# Patient Record
Sex: MALE | Race: WHITE | Employment: FULL TIME | URBAN - METROPOLITAN AREA
[De-identification: names, ages, dates, MRNs, and addresses within clinical notes are randomized per-mention and may not be internally consistent; named-entity substitution may affect disease eponyms.]

---

## 2017-01-11 ENCOUNTER — APPOINTMENT (OUTPATIENT)
Dept: LAB | Age: 57
End: 2017-01-11
Attending: INTERNAL MEDICINE

## 2017-01-11 DIAGNOSIS — K21.9 GASTROESOPHAGEAL REFLUX DISEASE, ESOPHAGITIS PRESENCE NOT SPECIFIED: ICD-10-CM

## 2017-01-11 DIAGNOSIS — K63.5 POLYP OF COLON, UNSPECIFIED PART OF COLON, UNSPECIFIED TYPE: ICD-10-CM

## 2017-01-11 PROCEDURE — 88305 TISSUE EXAM BY PATHOLOGIST: CPT

## 2017-01-23 NOTE — PROGRESS NOTES
Quick Note:    1/23/2017  [address]   [address] [address]    Dear Memory Marco A,      Here are the biopsy/pathology findings from your recent EGD (upper endoscopy) and colonoscopy:     The biopsy/pathology findings from your upper endosco

## 2017-02-06 PROBLEM — M67.449 DIGITAL MUCINOUS CYST: Status: ACTIVE | Noted: 2017-02-06

## 2017-03-08 PROBLEM — Z79.899 ENCOUNTER FOR LONG-TERM (CURRENT) USE OF MEDICATIONS: Status: ACTIVE | Noted: 2017-03-08

## 2017-03-08 PROBLEM — Z12.5 SPECIAL SCREENING FOR MALIGNANT NEOPLASM OF PROSTATE: Status: ACTIVE | Noted: 2017-03-08

## 2017-03-08 PROBLEM — Z00.00 LABORATORY EXAMINATION ORDERED AS PART OF A ROUTINE GENERAL MEDICAL EXAMINATION: Status: ACTIVE | Noted: 2017-03-08

## 2017-03-08 PROBLEM — Z11.59 NEED FOR HEPATITIS C SCREENING TEST: Status: ACTIVE | Noted: 2017-03-08

## 2017-04-02 PROBLEM — J32.0 CHRONIC MAXILLARY SINUSITIS: Status: ACTIVE | Noted: 2017-04-02

## 2017-08-04 PROCEDURE — 82043 UR ALBUMIN QUANTITATIVE: CPT | Performed by: INTERNAL MEDICINE

## 2017-08-04 PROCEDURE — 84153 ASSAY OF PSA TOTAL: CPT | Performed by: INTERNAL MEDICINE

## 2017-08-04 PROCEDURE — 82570 ASSAY OF URINE CREATININE: CPT | Performed by: INTERNAL MEDICINE

## 2017-08-07 PROBLEM — Z80.42 FAMILY HISTORY OF PROSTATE CANCER IN FATHER: Status: ACTIVE | Noted: 2017-08-07

## 2017-08-07 PROBLEM — Z00.00 LABORATORY EXAMINATION ORDERED AS PART OF A ROUTINE GENERAL MEDICAL EXAMINATION: Status: ACTIVE | Noted: 2017-08-07

## 2017-08-07 PROBLEM — Z79.899 ENCOUNTER FOR LONG-TERM (CURRENT) USE OF MEDICATIONS: Status: ACTIVE | Noted: 2017-08-07

## 2017-08-07 PROBLEM — Z12.5 SPECIAL SCREENING FOR MALIGNANT NEOPLASM OF PROSTATE: Status: ACTIVE | Noted: 2017-08-07

## 2017-08-07 PROBLEM — Z11.59 NEED FOR HEPATITIS C SCREENING TEST: Status: ACTIVE | Noted: 2017-08-07

## 2017-09-29 PROCEDURE — 87147 CULTURE TYPE IMMUNOLOGIC: CPT | Performed by: SURGERY

## 2017-09-29 PROCEDURE — 87070 CULTURE OTHR SPECIMN AEROBIC: CPT | Performed by: SURGERY

## 2017-09-29 PROCEDURE — 87205 SMEAR GRAM STAIN: CPT | Performed by: SURGERY

## 2017-09-29 PROCEDURE — 87075 CULTR BACTERIA EXCEPT BLOOD: CPT | Performed by: SURGERY

## 2017-09-29 PROCEDURE — 87184 SC STD DISK METHOD PER PLATE: CPT | Performed by: SURGERY

## 2018-06-01 PROBLEM — J32.0 CHRONIC MAXILLARY SINUSITIS: Status: RESOLVED | Noted: 2017-04-02 | Resolved: 2018-06-01

## 2018-06-01 PROBLEM — M67.449 DIGITAL MUCINOUS CYST: Status: RESOLVED | Noted: 2017-02-06 | Resolved: 2018-06-01

## 2018-06-08 PROCEDURE — 82570 ASSAY OF URINE CREATININE: CPT | Performed by: INTERNAL MEDICINE

## 2018-06-08 PROCEDURE — 82043 UR ALBUMIN QUANTITATIVE: CPT | Performed by: INTERNAL MEDICINE

## 2020-08-03 ENCOUNTER — APPOINTMENT (OUTPATIENT)
Dept: LAB | Age: 60
End: 2020-08-03
Attending: FAMILY MEDICINE
Payer: COMMERCIAL

## 2020-08-03 ENCOUNTER — TELEPHONE (OUTPATIENT)
Dept: ENDOCRINOLOGY CLINIC | Facility: CLINIC | Age: 60
End: 2020-08-03

## 2020-08-03 ENCOUNTER — OFFICE VISIT (OUTPATIENT)
Dept: INTERNAL MEDICINE CLINIC | Facility: CLINIC | Age: 60
End: 2020-08-03
Payer: COMMERCIAL

## 2020-08-03 VITALS
RESPIRATION RATE: 16 BRPM | DIASTOLIC BLOOD PRESSURE: 68 MMHG | BODY MASS INDEX: 41.3 KG/M2 | HEIGHT: 73.23 IN | TEMPERATURE: 97 F | HEART RATE: 72 BPM | SYSTOLIC BLOOD PRESSURE: 128 MMHG | WEIGHT: 315 LBS

## 2020-08-03 DIAGNOSIS — E11.29 TYPE 2 DIABETES MELLITUS WITH MICROALBUMINURIA, WITHOUT LONG-TERM CURRENT USE OF INSULIN (HCC): Primary | ICD-10-CM

## 2020-08-03 DIAGNOSIS — E11.29 TYPE 2 DIABETES MELLITUS WITH MICROALBUMINURIA, WITHOUT LONG-TERM CURRENT USE OF INSULIN (HCC): ICD-10-CM

## 2020-08-03 DIAGNOSIS — I10 ESSENTIAL HYPERTENSION: ICD-10-CM

## 2020-08-03 DIAGNOSIS — R80.9 TYPE 2 DIABETES MELLITUS WITH MICROALBUMINURIA, WITHOUT LONG-TERM CURRENT USE OF INSULIN (HCC): Primary | ICD-10-CM

## 2020-08-03 DIAGNOSIS — G47.33 OSA ON CPAP: ICD-10-CM

## 2020-08-03 DIAGNOSIS — Z00.00 WELLNESS EXAMINATION: ICD-10-CM

## 2020-08-03 DIAGNOSIS — Z99.89 OSA ON CPAP: ICD-10-CM

## 2020-08-03 DIAGNOSIS — R80.9 TYPE 2 DIABETES MELLITUS WITH MICROALBUMINURIA, WITHOUT LONG-TERM CURRENT USE OF INSULIN (HCC): ICD-10-CM

## 2020-08-03 LAB
ALBUMIN SERPL-MCNC: 3.8 G/DL (ref 3.4–5)
ALBUMIN/GLOB SERPL: 1.1 {RATIO} (ref 1–2)
ALP LIVER SERPL-CCNC: 66 U/L (ref 45–117)
ALT SERPL-CCNC: 25 U/L (ref 16–61)
ANION GAP SERPL CALC-SCNC: 4 MMOL/L (ref 0–18)
AST SERPL-CCNC: 13 U/L (ref 15–37)
BILIRUB SERPL-MCNC: 0.6 MG/DL (ref 0.1–2)
BUN BLD-MCNC: 18 MG/DL (ref 7–18)
BUN/CREAT SERPL: 22.2 (ref 10–20)
CALCIUM BLD-MCNC: 8.9 MG/DL (ref 8.5–10.1)
CHLORIDE SERPL-SCNC: 104 MMOL/L (ref 98–112)
CHOLEST SMN-MCNC: 168 MG/DL (ref ?–200)
CO2 SERPL-SCNC: 30 MMOL/L (ref 21–32)
CREAT BLD-MCNC: 0.81 MG/DL (ref 0.7–1.3)
EST. AVERAGE GLUCOSE BLD GHB EST-MCNC: 163 MG/DL (ref 68–126)
GLOBULIN PLAS-MCNC: 3.6 G/DL (ref 2.8–4.4)
GLUCOSE BLD-MCNC: 205 MG/DL (ref 70–99)
HBA1C MFR BLD HPLC: 7.3 % (ref ?–5.7)
HDLC SERPL-MCNC: 39 MG/DL (ref 40–59)
LDLC SERPL CALC-MCNC: 96 MG/DL (ref ?–100)
M PROTEIN MFR SERPL ELPH: 7.4 G/DL (ref 6.4–8.2)
NONHDLC SERPL-MCNC: 129 MG/DL (ref ?–130)
OSMOLALITY SERPL CALC.SUM OF ELEC: 294 MOSM/KG (ref 275–295)
PATIENT FASTING Y/N/NP: YES
PATIENT FASTING Y/N/NP: YES
POTASSIUM SERPL-SCNC: 3.9 MMOL/L (ref 3.5–5.1)
SODIUM SERPL-SCNC: 138 MMOL/L (ref 136–145)
TRIGL SERPL-MCNC: 165 MG/DL (ref 30–149)
VLDLC SERPL CALC-MCNC: 33 MG/DL (ref 0–30)

## 2020-08-03 PROCEDURE — 80053 COMPREHEN METABOLIC PANEL: CPT

## 2020-08-03 PROCEDURE — 3074F SYST BP LT 130 MM HG: CPT | Performed by: FAMILY MEDICINE

## 2020-08-03 PROCEDURE — 80061 LIPID PANEL: CPT

## 2020-08-03 PROCEDURE — 99386 PREV VISIT NEW AGE 40-64: CPT | Performed by: FAMILY MEDICINE

## 2020-08-03 PROCEDURE — 3078F DIAST BP <80 MM HG: CPT | Performed by: FAMILY MEDICINE

## 2020-08-03 PROCEDURE — 83036 HEMOGLOBIN GLYCOSYLATED A1C: CPT

## 2020-08-03 PROCEDURE — 3008F BODY MASS INDEX DOCD: CPT | Performed by: FAMILY MEDICINE

## 2020-08-03 RX ORDER — PIOGLITAZONEHYDROCHLORIDE 30 MG/1
30 TABLET ORAL DAILY
Qty: 90 TABLET | Refills: 1 | Status: SHIPPED | OUTPATIENT
Start: 2020-08-03 | End: 2021-12-30

## 2020-08-03 RX ORDER — FOSINOPRIL SODIUM 20 MG/1
20 TABLET ORAL DAILY
Qty: 90 TABLET | Refills: 1 | Status: SHIPPED | OUTPATIENT
Start: 2020-08-03 | End: 2021-12-30

## 2020-08-03 RX ORDER — GLIMEPIRIDE 4 MG/1
4 TABLET ORAL DAILY
Qty: 90 TABLET | Refills: 1 | Status: SHIPPED | OUTPATIENT
Start: 2020-08-03 | End: 2021-07-29

## 2020-08-03 NOTE — PATIENT INSTRUCTIONS
Please obtain labs at earliest convenience. Continue all medications as directed. Call to schedule with diabetic educator. Follow-up in 4 weeks or sooner depending on results of labs.

## 2020-08-03 NOTE — PROGRESS NOTES
Tracy Meza  9/9/1960    Patient presents with:  Establish Care: SN Rm 6  Wellness Visit      HPI:   Tracy Meza is a 61year old male who presents with to establish care with a new primary care provider.  He has been treated for HTN, DM type II, and Fluticasone Propionate 50 MCG/ACT Nasal Suspension Two sprays in each nostril once daily 1 Bottle 6   • cetirizine (ZYRTEC) 10 MG Oral Tab Take 10 mg by mouth nightly as needed.   2   • Blood Glucose Monitoring Suppl (Nancy Mac IQ SYSTEM) W/DEVICE Does pain  NEURO: denies headaches    EXAM:   /68 (BP Location: Left arm, Patient Position: Sitting, Cuff Size: adult)   Pulse 72   Temp 97.2 °F (36.2 °C) (Temporal)   Resp 16   Ht 73.23\"   Wt (!) 328 lb (148.8 kg)   BMI 43.01 kg/m²   GENERAL: Well devel HCl 30 MG Oral Tab; Take 1 tablet (30 mg total) by mouth daily. Dispense: 90 tablet; Refill: 1  - SITagliptin-metFORMIN HCl (JANUMET)  MG Oral Tab; Take 1 tablet by mouth 2 (two) times daily with meals. Dispense: 180 tablet;  Refill: 1  - OP REFERR

## 2020-08-04 DIAGNOSIS — E78.5 HYPERLIPIDEMIA, UNSPECIFIED HYPERLIPIDEMIA TYPE: Primary | ICD-10-CM

## 2020-08-07 ENCOUNTER — TELEPHONE (OUTPATIENT)
Dept: INTERNAL MEDICINE CLINIC | Facility: CLINIC | Age: 60
End: 2020-08-07

## 2020-09-03 ENCOUNTER — TELEPHONE (OUTPATIENT)
Dept: FAMILY MEDICINE CLINIC | Facility: CLINIC | Age: 60
End: 2020-09-03

## 2020-09-03 ENCOUNTER — OFFICE VISIT (OUTPATIENT)
Dept: INTERNAL MEDICINE CLINIC | Facility: CLINIC | Age: 60
End: 2020-09-03
Payer: COMMERCIAL

## 2020-09-03 VITALS
HEART RATE: 75 BPM | DIASTOLIC BLOOD PRESSURE: 80 MMHG | SYSTOLIC BLOOD PRESSURE: 136 MMHG | TEMPERATURE: 97 F | OXYGEN SATURATION: 97 % | HEIGHT: 74 IN | WEIGHT: 315 LBS | BODY MASS INDEX: 40.43 KG/M2 | RESPIRATION RATE: 16 BRPM

## 2020-09-03 DIAGNOSIS — J30.81 ALLERGIC RHINITIS DUE TO DOG HAIR: ICD-10-CM

## 2020-09-03 DIAGNOSIS — E11.29 TYPE 2 DIABETES MELLITUS WITH MICROALBUMINURIA, WITHOUT LONG-TERM CURRENT USE OF INSULIN (HCC): Primary | ICD-10-CM

## 2020-09-03 DIAGNOSIS — R80.9 TYPE 2 DIABETES MELLITUS WITH MICROALBUMINURIA, WITHOUT LONG-TERM CURRENT USE OF INSULIN (HCC): Primary | ICD-10-CM

## 2020-09-03 DIAGNOSIS — I10 ESSENTIAL HYPERTENSION: ICD-10-CM

## 2020-09-03 DIAGNOSIS — E66.01 CLASS 3 SEVERE OBESITY DUE TO EXCESS CALORIES WITH BODY MASS INDEX (BMI) OF 40.0 TO 44.9 IN ADULT, UNSPECIFIED WHETHER SERIOUS COMORBIDITY PRESENT (HCC): ICD-10-CM

## 2020-09-03 PROCEDURE — 3075F SYST BP GE 130 - 139MM HG: CPT | Performed by: FAMILY MEDICINE

## 2020-09-03 PROCEDURE — 3079F DIAST BP 80-89 MM HG: CPT | Performed by: FAMILY MEDICINE

## 2020-09-03 PROCEDURE — 99214 OFFICE O/P EST MOD 30 MIN: CPT | Performed by: FAMILY MEDICINE

## 2020-09-03 PROCEDURE — 3008F BODY MASS INDEX DOCD: CPT | Performed by: FAMILY MEDICINE

## 2020-09-03 NOTE — PROGRESS NOTES
Coco Garcia  9/9/1960    Patient presents with: Follow - Up: jonh      Kyle Barnard is a 61year old male who presents for follow-up on his DM. He is doing well with his medications.  He was not able to get established with the DM educato (4 mg total) by mouth daily. 90 tablet 1   • Pioglitazone HCl 30 MG Oral Tab Take 1 tablet (30 mg total) by mouth daily. 90 tablet 1   • SITagliptin-metFORMIN HCl (JANUMET)  MG Oral Tab Take 1 tablet by mouth 2 (two) times daily with meals.  180 tabl Glasses of wine per week      Frequency: 2-3 times a week      Drinks per session: 1 or 2      Binge frequency: Never      Comment: CAGE 8/3/20    Drug use: No        OBJECTIVE:   /80   Pulse 75   Temp 97.2 °F (36.2 °C) (Temporal)   Resp 16   Ht 74\" Essential hypertension    3. Class 3 severe obesity due to excess calories with body mass index (BMI) of 40.0 to 44.9 in adult, unspecified whether serious comorbidity present ([address]    4.  Allergic rhinitis due to dog hair      All questions were answered a

## 2021-03-01 ENCOUNTER — TELEMEDICINE (OUTPATIENT)
Dept: TELEHEALTH | Age: 61
End: 2021-03-01

## 2021-03-01 DIAGNOSIS — J32.0 CHRONIC MAXILLARY SINUSITIS: Primary | ICD-10-CM

## 2021-03-01 PROCEDURE — 99203 OFFICE O/P NEW LOW 30 MIN: CPT | Performed by: NURSE PRACTITIONER

## 2021-03-01 RX ORDER — CLARITHROMYCIN 500 MG/1
500 TABLET, COATED ORAL 2 TIMES DAILY
Qty: 20 TABLET | Refills: 0 | Status: SHIPPED | OUTPATIENT
Start: 2021-03-01 | End: 2021-09-30

## 2021-03-01 NOTE — PROGRESS NOTES
This is a telemedicine visit with live, interactive video and audio. Patient understands and accepts financial responsibility for any deductible, co-insurance and/or co-pays associated with this service.     SUBJECTIVE  \"I'm sure I have a sinus infecti Inhale 1 puff into the lungs every 6 (six) hours as needed for Wheezing.  1 Inhaler 0   • Glucose Blood In Vitro Strip SMBG  each 6   • Fluticasone Propionate 50 MCG/ACT Nasal Suspension Two sprays in each nostril once daily 1 Bottle 6   • cetirizine nasal spray over the counter to loosen nasal secretions  · Finish all antibiotics as ordered      LAURA Martines    (10) Minutes of time was accrued for this Video Visit.

## 2021-03-01 NOTE — PATIENT INSTRUCTIONS
Sinusitis (Antibiotic Treatment)    The sinuses are air-filled spaces within the bones of the face. They connect to the inside of the nose. Sinusitis is an inflammation of the tissue that lines the sinuses. Sinusitis can occur during a cold.  It can also pressure should not use decongestants. They can raise blood pressure.) Talk with your provider or pharmacist if you have any questions about the medicine. .  · OTC antihistamines may help if allergies contributed to your sinusitis.  Talk with your provider o information is not intended as a substitute for professional medical care. Always follow your healthcare professional's instructions.

## 2021-03-20 DIAGNOSIS — Z23 NEED FOR VACCINATION: ICD-10-CM

## 2021-09-30 ENCOUNTER — HOSPITAL ENCOUNTER (OUTPATIENT)
Age: 61
Discharge: HOME | End: 2021-09-30
Payer: COMMERCIAL

## 2021-09-30 VITALS
TEMPERATURE: 98 F | HEART RATE: 94 BPM | HEIGHT: 74 IN | WEIGHT: 305 LBS | OXYGEN SATURATION: 99 % | RESPIRATION RATE: 16 BRPM | SYSTOLIC BLOOD PRESSURE: 142 MMHG | BODY MASS INDEX: 39.14 KG/M2 | DIASTOLIC BLOOD PRESSURE: 86 MMHG

## 2021-09-30 DIAGNOSIS — Z11.52 ENCOUNTER FOR SCREENING FOR COVID-19: ICD-10-CM

## 2021-09-30 DIAGNOSIS — J01.90 ACUTE NON-RECURRENT SINUSITIS, UNSPECIFIED LOCATION: Primary | ICD-10-CM

## 2021-09-30 PROCEDURE — 99213 OFFICE O/P EST LOW 20 MIN: CPT | Performed by: PHYSICIAN ASSISTANT

## 2021-09-30 PROCEDURE — U0002 COVID-19 LAB TEST NON-CDC: HCPCS | Performed by: PHYSICIAN ASSISTANT

## 2021-09-30 RX ORDER — DOXYCYCLINE HYCLATE 100 MG/1
100 CAPSULE ORAL 2 TIMES DAILY
Qty: 14 CAPSULE | Refills: 0 | Status: SHIPPED | OUTPATIENT
Start: 2021-09-30 | End: 2021-10-07

## 2021-09-30 NOTE — ED INITIAL ASSESSMENT (HPI)
Patient here for evaluation of sinus pain and pressure with throbbing headaches that started 4-5 days ago.  States the right side of his face is more painful than left and that is the side he had sinus surgery in the past. This morning he had a couple of bl

## 2021-09-30 NOTE — ED PROVIDER NOTES
Patient Seen in: Immediate [address] [place] [address]      History   Patient presents with:  Sinus Problem    Stated Complaint: sinus problem    Subjective:   HPI    15-year-old male presents to the [address] [place] [address] for evaluation of sinusitis symptoms for 4 days.   Has fac nursing note reviewed. Constitutional:       Appearance: He is well-developed. HENT:      Head: Atraumatic. Right Ear: External ear normal.      Left Ear: External ear normal.      Nose: Congestion present. No rhinorrhea.       Right Sinus: [place] [id number]  [phone]                Medications Prescribed:  Current Discharge Medication List    START taking these medications    doxycycline 100 MG Oral Cap  Take 1 capsule (100 mg total) by mouth 2 (two) times daily for 7 days.   Qty: 14 capsule Refills:

## 2021-10-05 ENCOUNTER — HOSPITAL ENCOUNTER (OUTPATIENT)
Age: 61
Discharge: HOME | End: 2021-10-05
Payer: COMMERCIAL

## 2021-10-05 VITALS
WEIGHT: 306 LBS | RESPIRATION RATE: 18 BRPM | HEART RATE: 96 BPM | HEIGHT: 74 IN | SYSTOLIC BLOOD PRESSURE: 143 MMHG | BODY MASS INDEX: 39.27 KG/M2 | OXYGEN SATURATION: 95 % | DIASTOLIC BLOOD PRESSURE: 79 MMHG | TEMPERATURE: 101 F

## 2021-10-05 DIAGNOSIS — J01.91 ACUTE RECURRENT SINUSITIS, UNSPECIFIED LOCATION: Primary | ICD-10-CM

## 2021-10-05 PROCEDURE — 99214 OFFICE O/P EST MOD 30 MIN: CPT | Performed by: PHYSICIAN ASSISTANT

## 2021-10-05 RX ORDER — LEVOFLOXACIN 750 MG/1
750 TABLET ORAL DAILY
Qty: 10 TABLET | Refills: 0 | Status: SHIPPED | OUTPATIENT
Start: 2021-10-05 | End: 2021-10-15

## 2021-10-05 RX ORDER — ACETAMINOPHEN 325 MG/1
650 TABLET ORAL ONCE
Status: COMPLETED | OUTPATIENT
Start: 2021-10-05 | End: 2021-10-05

## 2021-10-05 NOTE — ED PROVIDER NOTES
Patient Seen in: Immediate [address] [place] [address]      History   Patient presents with:  Sinus Problem    Stated Complaint: sinus sensitivity chills x 10 days    Subjective:   HPI    59-year-old male presents the immediate care for what he considers a sin Musculoskeletal: Negative for neck stiffness. Allergic/Immunologic: Negative for food allergies. Neurological: Negative for headaches. Psychiatric/Behavioral: Negative for suicidal ideas. All other systems reviewed and are negative.       Positive weakness.       Coordination: Coordination normal.      Gait: Gait normal.   Psychiatric:         Mood and Affect: Mood normal.               ED Course   Labs Reviewed - No data to display                MDM      44-year-old male presents immediate care for

## 2021-10-05 NOTE — ED INITIAL ASSESSMENT (HPI)
Patient here for continued sinus pain and pressure. States the doxycycline isn't helping. He has had continued to have chills. Symptoms have been present x about 10 days but have continued to get worse.  States in the past, levaquin has been the only antibi

## 2021-11-02 ENCOUNTER — TELEPHONE (OUTPATIENT)
Dept: INTERNAL MEDICINE CLINIC | Facility: CLINIC | Age: 61
End: 2021-11-02

## 2021-11-02 NOTE — TELEPHONE ENCOUNTER
Received eye exam that shows mild non-proliferative diabetic retinopathy. Patient is overdue for DM follow-up. Please call to schedule.

## 2021-11-05 ENCOUNTER — PATIENT MESSAGE (OUTPATIENT)
Dept: INTERNAL MEDICINE CLINIC | Facility: CLINIC | Age: 61
End: 2021-11-05

## 2021-11-16 ENCOUNTER — TELEPHONE (OUTPATIENT)
Dept: INTERNAL MEDICINE CLINIC | Facility: CLINIC | Age: 61
End: 2021-11-16

## 2021-11-16 NOTE — TELEPHONE ENCOUNTER
Patient scheduled for cpe, DM fu.     Future Appointments   Date Time Provider Dannie Clarke   11/29/2021  8:40 AM Ayanna Rodrigues MD [facility]

## 2021-11-16 NOTE — TELEPHONE ENCOUNTER
Orders to Leti Sheht aware must fast no call back required  Future Appointments   Date Time Provider Dannie Clarke   11/29/2021  8:40 AM Sakina Foster MD [facility]

## 2021-12-30 ENCOUNTER — HOSPITAL ENCOUNTER (OUTPATIENT)
Dept: ULTRASOUND IMAGING | Age: 61
Discharge: HOME | End: 2021-12-30
Attending: FAMILY MEDICINE
Payer: COMMERCIAL

## 2021-12-30 ENCOUNTER — OFFICE VISIT (OUTPATIENT)
Dept: INTERNAL MEDICINE CLINIC | Facility: CLINIC | Age: 61
End: 2021-12-30
Payer: COMMERCIAL

## 2021-12-30 VITALS
BODY MASS INDEX: 38.5 KG/M2 | DIASTOLIC BLOOD PRESSURE: 82 MMHG | WEIGHT: 300 LBS | TEMPERATURE: 97 F | HEART RATE: 60 BPM | RESPIRATION RATE: 16 BRPM | HEIGHT: 74 IN | SYSTOLIC BLOOD PRESSURE: 142 MMHG

## 2021-12-30 DIAGNOSIS — N50.89 SCROTAL SWELLING: ICD-10-CM

## 2021-12-30 DIAGNOSIS — E78.5 HYPERLIPIDEMIA, UNSPECIFIED HYPERLIPIDEMIA TYPE: ICD-10-CM

## 2021-12-30 DIAGNOSIS — R80.9 TYPE 2 DIABETES MELLITUS WITH MICROALBUMINURIA, WITHOUT LONG-TERM CURRENT USE OF INSULIN (HCC): ICD-10-CM

## 2021-12-30 DIAGNOSIS — N50.811 RIGHT TESTICULAR PAIN: ICD-10-CM

## 2021-12-30 DIAGNOSIS — E11.29 TYPE 2 DIABETES MELLITUS WITH MICROALBUMINURIA, WITHOUT LONG-TERM CURRENT USE OF INSULIN (HCC): ICD-10-CM

## 2021-12-30 DIAGNOSIS — N50.811 RIGHT TESTICULAR PAIN: Primary | ICD-10-CM

## 2021-12-30 DIAGNOSIS — I10 ESSENTIAL HYPERTENSION: ICD-10-CM

## 2021-12-30 PROCEDURE — 3077F SYST BP >= 140 MM HG: CPT | Performed by: FAMILY MEDICINE

## 2021-12-30 PROCEDURE — 3008F BODY MASS INDEX DOCD: CPT | Performed by: FAMILY MEDICINE

## 2021-12-30 PROCEDURE — 93975 VASCULAR STUDY: CPT | Performed by: FAMILY MEDICINE

## 2021-12-30 PROCEDURE — 87186 SC STD MICRODIL/AGAR DIL: CPT | Performed by: FAMILY MEDICINE

## 2021-12-30 PROCEDURE — 81003 URINALYSIS AUTO W/O SCOPE: CPT | Performed by: FAMILY MEDICINE

## 2021-12-30 PROCEDURE — 87088 URINE BACTERIA CULTURE: CPT | Performed by: FAMILY MEDICINE

## 2021-12-30 PROCEDURE — 99214 OFFICE O/P EST MOD 30 MIN: CPT | Performed by: FAMILY MEDICINE

## 2021-12-30 PROCEDURE — 87086 URINE CULTURE/COLONY COUNT: CPT | Performed by: FAMILY MEDICINE

## 2021-12-30 PROCEDURE — 76870 US EXAM SCROTUM: CPT | Performed by: FAMILY MEDICINE

## 2021-12-30 PROCEDURE — 3079F DIAST BP 80-89 MM HG: CPT | Performed by: FAMILY MEDICINE

## 2021-12-30 RX ORDER — SITAGLIPTIN AND METFORMIN HYDROCHLORIDE 50; 1000 MG/1; MG/1
1 TABLET, FILM COATED ORAL 2 TIMES DAILY WITH MEALS
Qty: 180 TABLET | Refills: 1 | Status: SHIPPED | OUTPATIENT
Start: 2021-12-30

## 2021-12-30 RX ORDER — PIOGLITAZONEHYDROCHLORIDE 30 MG/1
30 TABLET ORAL DAILY
Qty: 90 TABLET | Refills: 1 | Status: SHIPPED | OUTPATIENT
Start: 2021-12-30

## 2021-12-30 RX ORDER — FOSINOPRIL SODIUM 20 MG/1
20 TABLET ORAL DAILY
Qty: 90 TABLET | Refills: 1 | Status: SHIPPED | OUTPATIENT
Start: 2021-12-30

## 2021-12-30 NOTE — PROGRESS NOTES
Richard Presley  9/9/1960    Patient presents with:  Pain: RM 9 JY, pain started a week ago, right side on groin and testicle  is painful and swollen, uncomfortable when walking       HPI:   Richard Presley is a 64year old male who presents for evaluation o hyperlipidemia     Type 2 diabetes mellitus with microalbuminuria, without long-term current use of insulin [facility])     Past Surgical History:   Procedure Laterality Date   • COLONOSCOPY  2008   • COLONOSCOPY  01/11/2017    adenomatous polyp, diverticuli and will update me next week with symptoms. 1. Right testicular pain, Scrotal swelling  - US SCROTUM W/ DOPPLER (CPT=93975/33566);  Future  - URINE CULTURE, ROUTINE; Future  - URINALYSIS, AUTO, W/O SCOPE  - URINE CULTURE, ROUTINE      All questions were ans

## 2021-12-31 DIAGNOSIS — N45.1 ACUTE EPIDIDYMITIS: Primary | ICD-10-CM

## 2021-12-31 RX ORDER — LEVOFLOXACIN 500 MG/1
500 TABLET, FILM COATED ORAL DAILY
Qty: 10 TABLET | Refills: 0 | Status: SHIPPED | OUTPATIENT
Start: 2021-12-31 | End: 2022-01-10

## 2022-07-25 ENCOUNTER — TELEPHONE (OUTPATIENT)
Dept: INTERNAL MEDICINE CLINIC | Facility: CLINIC | Age: 62
End: 2022-07-25

## 2022-10-11 ENCOUNTER — TELEPHONE (OUTPATIENT)
Dept: INTERNAL MEDICINE CLINIC | Facility: CLINIC | Age: 62
End: 2022-10-11

## 2022-10-12 NOTE — TELEPHONE ENCOUNTER
Pt due for OV/CPE with [address] Labs ordered in 11/21. Schedule soon, complete fasting labs prior to OV. If not able to reach pt after multiple attempts then send letter.

## 2022-10-13 ENCOUNTER — LAB ENCOUNTER (OUTPATIENT)
Dept: LAB | Age: 62
End: 2022-10-13
Attending: FAMILY MEDICINE
Payer: COMMERCIAL

## 2022-10-13 DIAGNOSIS — E11.29 TYPE 2 DIABETES MELLITUS WITH MICROALBUMINURIA, WITHOUT LONG-TERM CURRENT USE OF INSULIN (HCC): ICD-10-CM

## 2022-10-13 DIAGNOSIS — Z12.5 SCREENING FOR MALIGNANT NEOPLASM OF PROSTATE: ICD-10-CM

## 2022-10-13 DIAGNOSIS — Z13.228 SCREENING FOR METABOLIC DISORDER: ICD-10-CM

## 2022-10-13 DIAGNOSIS — Z13.220 SCREENING FOR LIPID DISORDERS: ICD-10-CM

## 2022-10-13 DIAGNOSIS — Z00.00 ROUTINE GENERAL MEDICAL EXAMINATION AT A HEALTH CARE FACILITY: ICD-10-CM

## 2022-10-13 DIAGNOSIS — R80.9 TYPE 2 DIABETES MELLITUS WITH MICROALBUMINURIA, WITHOUT LONG-TERM CURRENT USE OF INSULIN (HCC): ICD-10-CM

## 2022-10-13 DIAGNOSIS — Z13.0 SCREENING FOR DISORDER OF BLOOD AND BLOOD-FORMING ORGANS: ICD-10-CM

## 2022-10-13 LAB
ALBUMIN SERPL-MCNC: 3.6 G/DL (ref 3.4–5)
ALBUMIN/GLOB SERPL: 1 {RATIO} (ref 1–2)
ALP LIVER SERPL-CCNC: 100 U/L
ALT SERPL-CCNC: 26 U/L
ANION GAP SERPL CALC-SCNC: 6 MMOL/L (ref 0–18)
AST SERPL-CCNC: 10 U/L (ref 15–37)
BASOPHILS # BLD AUTO: 0.08 X10(3) UL (ref 0–0.2)
BASOPHILS NFR BLD AUTO: 1.1 %
BILIRUB SERPL-MCNC: 0.8 MG/DL (ref 0.1–2)
BUN BLD-MCNC: 14 MG/DL (ref 7–18)
BUN/CREAT SERPL: 15.9 (ref 10–20)
CALCIUM BLD-MCNC: 9 MG/DL (ref 8.5–10.1)
CHLORIDE SERPL-SCNC: 101 MMOL/L (ref 98–112)
CHOLEST SERPL-MCNC: 163 MG/DL (ref ?–200)
CO2 SERPL-SCNC: 29 MMOL/L (ref 21–32)
COMPLEXED PSA SERPL-MCNC: 0.29 NG/ML (ref ?–4)
CREAT BLD-MCNC: 0.88 MG/DL
CREAT UR-SCNC: 64.9 MG/DL
DEPRECATED RDW RBC AUTO: 40.6 FL (ref 35.1–46.3)
EOSINOPHIL # BLD AUTO: 0.34 X10(3) UL (ref 0–0.7)
EOSINOPHIL NFR BLD AUTO: 4.8 %
ERYTHROCYTE [DISTWIDTH] IN BLOOD BY AUTOMATED COUNT: 12.6 % (ref 11–15)
EST. AVERAGE GLUCOSE BLD GHB EST-MCNC: 361 MG/DL (ref 68–126)
FASTING PATIENT LIPID ANSWER: YES
FASTING STATUS PATIENT QL REPORTED: YES
GFR SERPLBLD BASED ON 1.73 SQ M-ARVRAT: 97 ML/MIN/1.73M2 (ref 60–?)
GLOBULIN PLAS-MCNC: 3.6 G/DL (ref 2.8–4.4)
GLUCOSE BLD-MCNC: 376 MG/DL (ref 70–99)
HBA1C MFR BLD: 14.2 % (ref ?–5.7)
HCT VFR BLD AUTO: 46.9 %
HDLC SERPL-MCNC: 37 MG/DL (ref 40–59)
HGB BLD-MCNC: 15.2 G/DL
IMM GRANULOCYTES # BLD AUTO: 0.06 X10(3) UL (ref 0–1)
IMM GRANULOCYTES NFR BLD: 0.8 %
LDLC SERPL CALC-MCNC: 87 MG/DL (ref ?–100)
LYMPHOCYTES # BLD AUTO: 1.64 X10(3) UL (ref 1–4)
LYMPHOCYTES NFR BLD AUTO: 23.2 %
MCH RBC QN AUTO: 28.5 PG (ref 26–34)
MCHC RBC AUTO-ENTMCNC: 32.4 G/DL (ref 31–37)
MCV RBC AUTO: 87.8 FL
MICROALBUMIN UR-MCNC: 4.53 MG/DL
MICROALBUMIN/CREAT 24H UR-RTO: 69.8 UG/MG (ref ?–30)
MONOCYTES # BLD AUTO: 0.58 X10(3) UL (ref 0.1–1)
MONOCYTES NFR BLD AUTO: 8.2 %
NEUTROPHILS # BLD AUTO: 4.37 X10 (3) UL (ref 1.5–7.7)
NEUTROPHILS # BLD AUTO: 4.37 X10(3) UL (ref 1.5–7.7)
NEUTROPHILS NFR BLD AUTO: 61.9 %
NONHDLC SERPL-MCNC: 126 MG/DL (ref ?–130)
OSMOLALITY SERPL CALC.SUM OF ELEC: 298 MOSM/KG (ref 275–295)
PLATELET # BLD AUTO: 257 10(3)UL (ref 150–450)
POTASSIUM SERPL-SCNC: 4.3 MMOL/L (ref 3.5–5.1)
PROT SERPL-MCNC: 7.2 G/DL (ref 6.4–8.2)
RBC # BLD AUTO: 5.34 X10(6)UL
SODIUM SERPL-SCNC: 136 MMOL/L (ref 136–145)
TRIGL SERPL-MCNC: 233 MG/DL (ref 30–149)
VLDLC SERPL CALC-MCNC: 38 MG/DL (ref 0–30)
WBC # BLD AUTO: 7.1 X10(3) UL (ref 4–11)

## 2022-10-13 PROCEDURE — 82043 UR ALBUMIN QUANTITATIVE: CPT | Performed by: FAMILY MEDICINE

## 2022-10-13 PROCEDURE — 83036 HEMOGLOBIN GLYCOSYLATED A1C: CPT | Performed by: FAMILY MEDICINE

## 2022-10-13 PROCEDURE — 3061F NEG MICROALBUMINURIA REV: CPT | Performed by: FAMILY MEDICINE

## 2022-10-13 PROCEDURE — 80053 COMPREHEN METABOLIC PANEL: CPT | Performed by: FAMILY MEDICINE

## 2022-10-13 PROCEDURE — 80061 LIPID PANEL: CPT | Performed by: FAMILY MEDICINE

## 2022-10-13 PROCEDURE — 84153 ASSAY OF PSA TOTAL: CPT | Performed by: FAMILY MEDICINE

## 2022-10-13 PROCEDURE — 3046F HEMOGLOBIN A1C LEVEL >9.0%: CPT | Performed by: FAMILY MEDICINE

## 2022-10-13 PROCEDURE — 82570 ASSAY OF URINE CREATININE: CPT | Performed by: FAMILY MEDICINE

## 2022-10-13 PROCEDURE — 3060F POS MICROALBUMINURIA REV: CPT | Performed by: FAMILY MEDICINE

## 2022-10-13 PROCEDURE — 85025 COMPLETE CBC W/AUTO DIFF WBC: CPT | Performed by: FAMILY MEDICINE

## 2022-10-21 ENCOUNTER — OFFICE VISIT (OUTPATIENT)
Dept: INTERNAL MEDICINE CLINIC | Facility: CLINIC | Age: 62
End: 2022-10-21
Payer: COMMERCIAL

## 2022-10-21 VITALS
OXYGEN SATURATION: 99 % | WEIGHT: 289.38 LBS | HEART RATE: 89 BPM | RESPIRATION RATE: 18 BRPM | DIASTOLIC BLOOD PRESSURE: 98 MMHG | HEIGHT: 74 IN | BODY MASS INDEX: 37.14 KG/M2 | SYSTOLIC BLOOD PRESSURE: 162 MMHG

## 2022-10-21 DIAGNOSIS — Z00.00 WELLNESS EXAMINATION: Primary | ICD-10-CM

## 2022-10-21 DIAGNOSIS — E11.65 TYPE 2 DIABETES MELLITUS WITH HYPERGLYCEMIA, WITHOUT LONG-TERM CURRENT USE OF INSULIN (HCC): ICD-10-CM

## 2022-10-21 DIAGNOSIS — E78.2 MIXED HYPERLIPIDEMIA: ICD-10-CM

## 2022-10-21 DIAGNOSIS — Z86.010 HISTORY OF COLON POLYPS: ICD-10-CM

## 2022-10-21 DIAGNOSIS — Z99.89 OSA ON CPAP: ICD-10-CM

## 2022-10-21 DIAGNOSIS — Z12.11 COLON CANCER SCREENING: ICD-10-CM

## 2022-10-21 DIAGNOSIS — I10 ESSENTIAL HYPERTENSION: ICD-10-CM

## 2022-10-21 DIAGNOSIS — G47.33 OSA ON CPAP: ICD-10-CM

## 2022-10-21 PROCEDURE — 99396 PREV VISIT EST AGE 40-64: CPT | Performed by: FAMILY MEDICINE

## 2022-10-21 PROCEDURE — 3080F DIAST BP >= 90 MM HG: CPT | Performed by: FAMILY MEDICINE

## 2022-10-21 PROCEDURE — 90471 IMMUNIZATION ADMIN: CPT | Performed by: FAMILY MEDICINE

## 2022-10-21 PROCEDURE — 3008F BODY MASS INDEX DOCD: CPT | Performed by: FAMILY MEDICINE

## 2022-10-21 PROCEDURE — 90677 PCV20 VACCINE IM: CPT | Performed by: FAMILY MEDICINE

## 2022-10-21 PROCEDURE — 3077F SYST BP >= 140 MM HG: CPT | Performed by: FAMILY MEDICINE

## 2022-10-21 RX ORDER — PIOGLITAZONEHYDROCHLORIDE 30 MG/1
30 TABLET ORAL DAILY
Qty: 90 TABLET | Refills: 1 | Status: SHIPPED | OUTPATIENT
Start: 2022-10-21

## 2022-10-21 RX ORDER — FOSINOPRIL SODIUM 20 MG/1
20 TABLET ORAL DAILY
Qty: 90 TABLET | Refills: 1 | Status: SHIPPED | OUTPATIENT
Start: 2022-10-21

## 2022-10-21 RX ORDER — SITAGLIPTIN AND METFORMIN HYDROCHLORIDE 1000; 50 MG/1; MG/1
1 TABLET, FILM COATED ORAL 2 TIMES DAILY WITH MEALS
Qty: 180 TABLET | Refills: 1 | Status: SHIPPED | OUTPATIENT
Start: 2022-10-21

## 2022-10-25 ENCOUNTER — NURSE ONLY (OUTPATIENT)
Dept: ENDOCRINOLOGY CLINIC | Facility: CLINIC | Age: 62
End: 2022-10-25
Payer: COMMERCIAL

## 2022-10-25 DIAGNOSIS — E11.29 TYPE 2 DIABETES MELLITUS WITH MICROALBUMINURIA, WITHOUT LONG-TERM CURRENT USE OF INSULIN (HCC): Primary | ICD-10-CM

## 2022-10-25 DIAGNOSIS — R80.9 TYPE 2 DIABETES MELLITUS WITH MICROALBUMINURIA, WITHOUT LONG-TERM CURRENT USE OF INSULIN (HCC): Primary | ICD-10-CM

## 2022-10-25 PROCEDURE — G0108 DIAB MANAGE TRN  PER INDIV: HCPCS | Performed by: DIETITIAN, REGISTERED

## 2022-10-25 RX ORDER — BLOOD-GLUCOSE SENSOR
1 EACH MISCELLANEOUS
Qty: 6 EACH | Refills: 3 | Status: CANCELLED | OUTPATIENT
Start: 2022-10-25

## 2022-10-26 ENCOUNTER — TELEPHONE (OUTPATIENT)
Dept: ENDOCRINOLOGY CLINIC | Facility: CLINIC | Age: 62
End: 2022-10-26

## 2022-10-26 RX ORDER — BLOOD-GLUCOSE SENSOR
1 EACH MISCELLANEOUS
Qty: 6 EACH | Refills: 3 | Status: SHIPPED | OUTPATIENT
Start: 2022-10-26

## 2022-10-26 NOTE — TELEPHONE ENCOUNTER
Pt. has no ins. coverage for Trent 3 CGM. He will pay out of pocket. Please sign pended prescription.

## 2022-10-27 VITALS — WEIGHT: 286.38 LBS | BODY MASS INDEX: 37 KG/M2

## 2022-10-27 NOTE — PROGRESS NOTES
Janie Grossman   1960 was seen for Diagnostic Continuous Glucose Sensor Initial Evaluation:    Date: 10/27/2022  Referring Provider: Dr. Chilango Ferrer  Start time: 2:30pm End time: 3:30pm    Pt. Interested in Continuous Glucose Monitoring:    Dexcom G6:  1. Sensor is worn for 10 days  2. Test blood glucose if symptoms do not match sensor reading. 3. How to choose and rotate sensor sites/ alternative sites. 4. Provides high/low alert w/ multiple vibrate/sound options; fixed alert if BG falls below 55 mg/dL  5. /phone needs to be within 20 ft of transmitter to receive data  6. How to handle MRI/Cat scans  7. Waterproof  8. Ability to use phone as   9 . Provided w/Dexcom [employer]  & tech support contact information    Freestyle Trent 2 CGM:  1. Sensor worn for a maximum of 14 days  2. No calibrations necessary  3. Interference w/Vitamin C >500 mg daily may cause false elevated blood sugar readings  4. Provides alert's/alarm's  5. Requires scanning every 8 hrs to receive data  6. How to deal with MRI/Cat Scan  7. Ability to use I-Phone as reader w/use of janae  8. Water resistant for up to 30 min in no deeper than 3 ft. Pt.'s insurance will not cover CGM systems; he will purchase the [place] 3 sensors out of pocket. He will return for training       Written materials were provided for reference. Patient verbalized understanding of all areas covered and has no further questions at this time.     Yasmine De La Torre RN, CDE

## 2022-10-28 ENCOUNTER — NURSE ONLY (OUTPATIENT)
Dept: ENDOCRINOLOGY CLINIC | Facility: CLINIC | Age: 62
End: 2022-10-28
Payer: COMMERCIAL

## 2022-10-28 VITALS — BODY MASS INDEX: 37 KG/M2 | WEIGHT: 287 LBS

## 2022-10-28 DIAGNOSIS — E11.65 TYPE 2 DIABETES MELLITUS WITH HYPERGLYCEMIA, WITHOUT LONG-TERM CURRENT USE OF INSULIN (HCC): ICD-10-CM

## 2022-10-28 PROCEDURE — 95249 CONT GLUC MNTR PT PROV EQP: CPT | Performed by: DIETITIAN, REGISTERED

## 2022-10-28 NOTE — PROGRESS NOTES
Leesa Borja  DOB1960 was seen for Personal Continuous Glucose Monitoring Training/Start:    Date: 10/28/2022  Referring Provider: Dr. Kati Gallagher  Start time: 9am End time: 10am    Sensor Type: [place] Santos 3 sample provided    Patient verbalized understanding of the followin. Sensor worn for a maximum of 14 days  2. No calibrations necessary  3. Interference w/Vitamin C >500 mg daily may cause false elevated blood sugar readings  4. Provides alert's/alarm's  5. Requires scanning every 8 hrs to receive data  6. How to deal with MRI/Cat Scan  7. Ability to use I-Phone as reader w/use of janae  8. Water resistant for up to 30 min in no deeper than 3 ft. Patient demonstrated ability to insert and start the sensor successfully. Patient placed sensor: Left arm    Sensor Settings:    High Alarm: 400 mg/dL    Low Alarm: 70 mg/dL    Current Oral Medication:  Janumet  mg 2 times daily  Actos 30 mg daily    Written materials were provided for all the content areas covered. Patient verbalized understanding and has no further questions at this time.     Plan: Follow-up in 2 wks to review download      Conrado Jones RN, CDE

## 2022-11-04 ENCOUNTER — OFFICE VISIT (OUTPATIENT)
Dept: INTERNAL MEDICINE CLINIC | Facility: CLINIC | Age: 62
End: 2022-11-04
Payer: COMMERCIAL

## 2022-11-04 VITALS
SYSTOLIC BLOOD PRESSURE: 124 MMHG | WEIGHT: 287.38 LBS | HEART RATE: 88 BPM | BODY MASS INDEX: 36.88 KG/M2 | OXYGEN SATURATION: 98 % | DIASTOLIC BLOOD PRESSURE: 74 MMHG | HEIGHT: 74 IN | RESPIRATION RATE: 16 BRPM

## 2022-11-04 DIAGNOSIS — I10 ESSENTIAL HYPERTENSION: ICD-10-CM

## 2022-11-04 DIAGNOSIS — E78.2 MIXED HYPERLIPIDEMIA: ICD-10-CM

## 2022-11-04 DIAGNOSIS — E11.65 TYPE 2 DIABETES MELLITUS WITH HYPERGLYCEMIA, WITHOUT LONG-TERM CURRENT USE OF INSULIN (HCC): Primary | ICD-10-CM

## 2022-11-04 PROCEDURE — 3078F DIAST BP <80 MM HG: CPT | Performed by: FAMILY MEDICINE

## 2022-11-04 PROCEDURE — 3008F BODY MASS INDEX DOCD: CPT | Performed by: FAMILY MEDICINE

## 2022-11-04 PROCEDURE — 99214 OFFICE O/P EST MOD 30 MIN: CPT | Performed by: FAMILY MEDICINE

## 2022-11-04 PROCEDURE — 3074F SYST BP LT 130 MM HG: CPT | Performed by: FAMILY MEDICINE

## 2022-11-11 ENCOUNTER — NURSE ONLY (OUTPATIENT)
Dept: ENDOCRINOLOGY CLINIC | Facility: CLINIC | Age: 62
End: 2022-11-11
Payer: COMMERCIAL

## 2022-11-11 VITALS — BODY MASS INDEX: 37 KG/M2 | WEIGHT: 289.19 LBS

## 2022-11-11 DIAGNOSIS — R80.9 TYPE 2 DIABETES MELLITUS WITH MICROALBUMINURIA, WITHOUT LONG-TERM CURRENT USE OF INSULIN (HCC): ICD-10-CM

## 2022-11-11 DIAGNOSIS — E11.29 TYPE 2 DIABETES MELLITUS WITH MICROALBUMINURIA, WITHOUT LONG-TERM CURRENT USE OF INSULIN (HCC): ICD-10-CM

## 2022-11-11 NOTE — PROGRESS NOTES
Robyn Bynum  DOB9/9/1960 was seen for Continuous Glucose Sensor Follow-up Visit:    Date: 11/11/2022  Referring Provider: Dr. Curtis Lane  Start time: 9am End time: 9:30am    Sensor Type: Rufus Quincy 2    Site Assessment: sensor intact      Reviewed CGMS download and patient logs:  CGM Analysis of data: dates  10/29/22-11/11/22  Sensor download: full report in media  Average glucose : 266 mg/dl     GMI (Glucose Management Indicator):9.7%  Glucose variability: 15.8%    38% time above 180mg /dl   61% time above 250 mg/dl  0% time in target range:  mg/dl   0% time below target range: 70mg/dl     Food/activity diary findings:   Pt. has made changes to his diet;eating healthier snacks such as veggies & eating smaller portions . He has an elipitical he currently purchased & plans on starting to exercise    Current diabetes medications:   Janumet 50/1000 mg 1 tab twice daily  Actos 30 mg daily    Injectables:   Type/Dose/Schedule: Marcio Tiffanie , Basaglar etc.  Action/Side Effects/Interactions/Precautions/Missed Doses: Basal insulin released over 24 hr period of time w/decreased risk for hypoglycemia. May not be mixed with other insulins. Requires injection at the same time every day. Type/Dose/Schedule: Ozempic, Trulicity  Action/Side Effects/ Interactions/Precauitions/Missed doses:  Incretin hormones stimulate pancreas to release insulin in response to food in the gut thereby decreasing post-prandial BG levels. Common side effects are dizziness, nausea, diarrhea. Contact PCP immediately if experiencing severe abd pain;may cause Pancreatitis. If dose missed, wait until next scheduled dose. Storage/Disposal: Unopened pens remain refrigerated; once opened, can remain at room temp for 28 days for insulin & 52 days for Ozempic days. Need to dispose of needles in sharps container. Recommended self-management changes: Pt. has already made changes to his diet & is more aware of carbs.  Should start exercising on his elliptical ; gradually increasing time per session & times per week. Goal is 30 min 5x/wk. Recommended oral medication/insulin changes:  Reviewed medication options to help lower BG level. Reinforced that he needs to continue with diet & exercise in addition to medications. Reviewed Basal insulin & GLP-1 (will need to stop Januvia if starting)    Plan:   Follow-up with PCP to discuss next steps  Schedule follow-up w/RN,CDCES after appt. w/PCP  CGMS download- color copy sent to scan. Download forwarded to provider. Patient verbalized understanding and has no further questions at this time.     Scott Razo, GABRIEL, CDE

## 2022-12-12 ENCOUNTER — TELEMEDICINE (OUTPATIENT)
Dept: INTERNAL MEDICINE CLINIC | Facility: CLINIC | Age: 62
End: 2022-12-12
Payer: COMMERCIAL

## 2022-12-12 DIAGNOSIS — E11.65 TYPE 2 DIABETES MELLITUS WITH HYPERGLYCEMIA, WITHOUT LONG-TERM CURRENT USE OF INSULIN (HCC): Primary | ICD-10-CM

## 2022-12-12 DIAGNOSIS — I10 ESSENTIAL HYPERTENSION: ICD-10-CM

## 2022-12-12 DIAGNOSIS — E78.2 MIXED HYPERLIPIDEMIA: ICD-10-CM

## 2022-12-12 RX ORDER — INSULIN GLARGINE 100 [IU]/ML
10 INJECTION, SOLUTION SUBCUTANEOUS NIGHTLY
Qty: 9 ML | Refills: 0 | Status: SHIPPED | OUTPATIENT
Start: 2022-12-12 | End: 2023-03-12

## 2022-12-12 RX ORDER — PEN NEEDLE, DIABETIC 30 GX3/16"
1 NEEDLE, DISPOSABLE MISCELLANEOUS NIGHTLY
Qty: 90 EACH | Refills: 0 | Status: SHIPPED | OUTPATIENT
Start: 2022-12-12

## 2022-12-12 NOTE — PROGRESS NOTES
Due to COVID-19 ACTION PLAN, the patient's office visit was converted to a video visit with informed patient consent. Time Spent: 20 min    Subjective     HPI:   Neno Alexandre is a 58year old male who presents for follow-up on his DM. He notes overall that his blood sugars have been stable and elevated despite dietary modifications, but showing some improvement. Low percentage of time in goal based on nate. REVIEW OF SYSTEMS:  GENERAL: No fever, chills, feels well otherwise. See HPI    Physical Exam:  Appears well on exam. Speaking well in full sentences. Assessment and Plan:  Neno Alexandre is presenting for follow-up    DM Type II  Patient has had sub-optimal improvement in overall glycemic control with current treatment. We will begin lantus at this time. Will continue to monitor BS with CGM. Continue current oral therapy. Follow-up in one week to review. - insulin glargine (LANTUS SOLOSTAR) 100 UNIT/ML Subcutaneous Solution Pen-injector; Inject 10 Units into the skin nightly. Dispense: 9 mL; Refill: 0  - Insulin Pen Needle (PEN NEEDLES) 32G X 4 MM Does not apply Misc; 1 each at bedtime. Dispense: 90 each; Refill: 0    Essential hypertension  Continue current treatment. Mixed hyperlipidemia  Continue statin. Phyllis Newman MD    Neno Alexandre understands phone evaluation is not a substitute for face-to-face examination or emergency care. Patient advised to go to ER or call 911 for worsening symptoms or acute distress. Please note that the following visit was completed using two-way, real-time interactive video communication. This has been done in good clifton to provide continuity of care in the best interest of the provider-patient relationship, due to the on-going public health crisis/national emergency and because of restrictions of visitation. There are limitations of this visit as no physical exam could be performed.   Every conscious effort was taken to allow for sufficient and adequate time. This billing visit was spent on reviewing labs, medications, radiology tests and decision making. Appropriate medical decision-making and tests are ordered as detailed in the plan of care above.

## 2022-12-21 ENCOUNTER — TELEMEDICINE (OUTPATIENT)
Dept: INTERNAL MEDICINE CLINIC | Facility: CLINIC | Age: 62
End: 2022-12-21
Payer: COMMERCIAL

## 2022-12-21 DIAGNOSIS — R80.9 TYPE 2 DIABETES MELLITUS WITH MICROALBUMINURIA, WITHOUT LONG-TERM CURRENT USE OF INSULIN (HCC): Primary | ICD-10-CM

## 2022-12-21 DIAGNOSIS — E11.29 TYPE 2 DIABETES MELLITUS WITH MICROALBUMINURIA, WITHOUT LONG-TERM CURRENT USE OF INSULIN (HCC): Primary | ICD-10-CM

## 2022-12-21 PROCEDURE — 99213 OFFICE O/P EST LOW 20 MIN: CPT | Performed by: FAMILY MEDICINE

## 2022-12-21 NOTE — PROGRESS NOTES
Due to COVID-19 ACTION PLAN, the patient's office visit was converted to a video visit with informed patient consent. Time Spent: 15 min    Subjective     HPI:   Rosa Harris is a 58year old male who presents for follow-up. He did start his lantus after a few days of delay. He has had improvement in his glycemic control over the last week. His daily average over the last 3 days he has noted significant improvement in time at goal with his CGM. He is not having     REVIEW OF SYSTEMS:  GENERAL: No fever, chills, feels well otherwise. Physical Exam:  Appears well on exam.    Assessment and Plan:  Rosa Harris is presenting for follow-up. DM Type II  Has been consistent with his current treatment on Janumet, pioglitazone, and Lantus at 10 units nightly with now significant improvement in glycemic control. Continue current treatment follow-up in office in 3 weeks with labs prior.   - COMP METABOLIC PANEL (14); Future  - LIPID PANEL; Future  - HEMOGLOBIN A1C; Future  - MICROALB/CREAT RATIO, RANDOM URINE; Future      Josue Gonzalez MD    Rosa Harris understands phone evaluation is not a substitute for face-to-face examination or emergency care. Patient advised to go to ER or call 911 for worsening symptoms or acute distress. Please note that the following visit was completed using two-way, real-time interactive video communication. This has been done in good clifton to provide continuity of care in the best interest of the provider-patient relationship, due to the on-going public health crisis/national emergency and because of restrictions of visitation. There are limitations of this visit as no physical exam could be performed. Every conscious effort was taken to allow for sufficient and adequate time. This billing visit was spent on reviewing labs, medications, radiology tests and decision making.   Appropriate medical decision-making and tests are ordered as detailed in the plan of care above.

## 2023-01-18 ENCOUNTER — LAB ENCOUNTER (OUTPATIENT)
Dept: LAB | Age: 63
End: 2023-01-18
Attending: FAMILY MEDICINE
Payer: COMMERCIAL

## 2023-01-18 DIAGNOSIS — R80.9 TYPE 2 DIABETES MELLITUS WITH MICROALBUMINURIA, WITHOUT LONG-TERM CURRENT USE OF INSULIN (HCC): ICD-10-CM

## 2023-01-18 DIAGNOSIS — E11.29 TYPE 2 DIABETES MELLITUS WITH MICROALBUMINURIA, WITHOUT LONG-TERM CURRENT USE OF INSULIN (HCC): ICD-10-CM

## 2023-01-18 LAB
ALBUMIN SERPL-MCNC: 3.9 G/DL (ref 3.4–5)
ALBUMIN/GLOB SERPL: 1.1 {RATIO} (ref 1–2)
ALP LIVER SERPL-CCNC: 72 U/L
ALT SERPL-CCNC: 25 U/L
ANION GAP SERPL CALC-SCNC: 3 MMOL/L (ref 0–18)
AST SERPL-CCNC: 14 U/L (ref 15–37)
BILIRUB SERPL-MCNC: 0.7 MG/DL (ref 0.1–2)
BUN BLD-MCNC: 19 MG/DL (ref 7–18)
CALCIUM BLD-MCNC: 9.2 MG/DL (ref 8.5–10.1)
CHLORIDE SERPL-SCNC: 105 MMOL/L (ref 98–112)
CHOLEST SERPL-MCNC: 124 MG/DL (ref ?–200)
CO2 SERPL-SCNC: 30 MMOL/L (ref 21–32)
CREAT BLD-MCNC: 0.75 MG/DL
CREAT UR-SCNC: 160 MG/DL
EST. AVERAGE GLUCOSE BLD GHB EST-MCNC: 220 MG/DL (ref 68–126)
FASTING PATIENT LIPID ANSWER: YES
FASTING STATUS PATIENT QL REPORTED: YES
GFR SERPLBLD BASED ON 1.73 SQ M-ARVRAT: 102 ML/MIN/1.73M2 (ref 60–?)
GLOBULIN PLAS-MCNC: 3.6 G/DL (ref 2.8–4.4)
GLUCOSE BLD-MCNC: 188 MG/DL (ref 70–99)
HBA1C MFR BLD: 9.3 % (ref ?–5.7)
HDLC SERPL-MCNC: 44 MG/DL (ref 40–59)
LDLC SERPL CALC-MCNC: 64 MG/DL (ref ?–100)
MICROALBUMIN UR-MCNC: 7.19 MG/DL
MICROALBUMIN/CREAT 24H UR-RTO: 44.9 UG/MG (ref ?–30)
NONHDLC SERPL-MCNC: 80 MG/DL (ref ?–130)
OSMOLALITY SERPL CALC.SUM OF ELEC: 293 MOSM/KG (ref 275–295)
POTASSIUM SERPL-SCNC: 3.9 MMOL/L (ref 3.5–5.1)
PROT SERPL-MCNC: 7.5 G/DL (ref 6.4–8.2)
SODIUM SERPL-SCNC: 138 MMOL/L (ref 136–145)
TRIGL SERPL-MCNC: 84 MG/DL (ref 30–149)
VLDLC SERPL CALC-MCNC: 13 MG/DL (ref 0–30)

## 2023-01-18 PROCEDURE — 3046F HEMOGLOBIN A1C LEVEL >9.0%: CPT | Performed by: FAMILY MEDICINE

## 2023-01-18 PROCEDURE — 82570 ASSAY OF URINE CREATININE: CPT | Performed by: FAMILY MEDICINE

## 2023-01-18 PROCEDURE — 80053 COMPREHEN METABOLIC PANEL: CPT | Performed by: FAMILY MEDICINE

## 2023-01-18 PROCEDURE — 83036 HEMOGLOBIN GLYCOSYLATED A1C: CPT | Performed by: FAMILY MEDICINE

## 2023-01-18 PROCEDURE — 82043 UR ALBUMIN QUANTITATIVE: CPT | Performed by: FAMILY MEDICINE

## 2023-01-18 PROCEDURE — 3061F NEG MICROALBUMINURIA REV: CPT | Performed by: FAMILY MEDICINE

## 2023-01-18 PROCEDURE — 80061 LIPID PANEL: CPT | Performed by: FAMILY MEDICINE

## 2023-01-18 PROCEDURE — 3060F POS MICROALBUMINURIA REV: CPT | Performed by: FAMILY MEDICINE

## 2023-01-19 ENCOUNTER — OFFICE VISIT (OUTPATIENT)
Dept: INTERNAL MEDICINE CLINIC | Facility: CLINIC | Age: 63
End: 2023-01-19
Payer: COMMERCIAL

## 2023-01-19 VITALS
OXYGEN SATURATION: 99 % | SYSTOLIC BLOOD PRESSURE: 122 MMHG | HEIGHT: 74 IN | HEART RATE: 72 BPM | WEIGHT: 301 LBS | DIASTOLIC BLOOD PRESSURE: 62 MMHG | TEMPERATURE: 98 F | BODY MASS INDEX: 38.63 KG/M2

## 2023-01-19 DIAGNOSIS — E11.29 TYPE 2 DIABETES MELLITUS WITH MICROALBUMINURIA, WITH LONG-TERM CURRENT USE OF INSULIN (HCC): Primary | ICD-10-CM

## 2023-01-19 DIAGNOSIS — R80.9 TYPE 2 DIABETES MELLITUS WITH MICROALBUMINURIA, WITH LONG-TERM CURRENT USE OF INSULIN (HCC): Primary | ICD-10-CM

## 2023-01-19 DIAGNOSIS — E78.2 MIXED HYPERLIPIDEMIA: ICD-10-CM

## 2023-01-19 DIAGNOSIS — Z79.4 TYPE 2 DIABETES MELLITUS WITH MICROALBUMINURIA, WITH LONG-TERM CURRENT USE OF INSULIN (HCC): Primary | ICD-10-CM

## 2023-01-19 DIAGNOSIS — I10 ESSENTIAL HYPERTENSION: ICD-10-CM

## 2023-01-19 PROCEDURE — 99214 OFFICE O/P EST MOD 30 MIN: CPT | Performed by: FAMILY MEDICINE

## 2023-01-19 PROCEDURE — 3074F SYST BP LT 130 MM HG: CPT | Performed by: FAMILY MEDICINE

## 2023-01-19 PROCEDURE — 3078F DIAST BP <80 MM HG: CPT | Performed by: FAMILY MEDICINE

## 2023-01-19 PROCEDURE — 3008F BODY MASS INDEX DOCD: CPT | Performed by: FAMILY MEDICINE

## 2023-01-19 NOTE — PROGRESS NOTES
CPE - Due 10/21/2023  PSA - UTD Until 10/13/2024  COLONOSCOPY - Schedule 2/2/2023    DIABETIC PT  Eye Exam - Due 6/25/2023  Foot Exam - Due Today  Micro - Completed 1/18/2023  A1C - Last Done 1/18/2023 was 9.3    Patient is due for yearly foot examination. Advised patient to remove his/her shoes. Diabetic flow sheet updated.

## 2023-02-02 PROBLEM — Z86.010 HISTORY OF ADENOMATOUS POLYP OF COLON: Status: ACTIVE | Noted: 2023-02-02

## 2023-02-02 PROBLEM — Z86.0101 HISTORY OF ADENOMATOUS POLYP OF COLON: Status: ACTIVE | Noted: 2023-02-02

## 2023-04-05 ENCOUNTER — OFFICE VISIT (OUTPATIENT)
Dept: FAMILY MEDICINE CLINIC | Facility: CLINIC | Age: 63
End: 2023-04-05
Payer: COMMERCIAL

## 2023-04-05 VITALS
OXYGEN SATURATION: 96 % | DIASTOLIC BLOOD PRESSURE: 68 MMHG | SYSTOLIC BLOOD PRESSURE: 134 MMHG | WEIGHT: 310.19 LBS | TEMPERATURE: 98 F | HEIGHT: 74 IN | RESPIRATION RATE: 20 BRPM | BODY MASS INDEX: 39.81 KG/M2 | HEART RATE: 86 BPM

## 2023-04-05 DIAGNOSIS — H60.503 ACUTE OTITIS EXTERNA OF BOTH EARS, UNSPECIFIED TYPE: Primary | ICD-10-CM

## 2023-04-05 PROCEDURE — 3078F DIAST BP <80 MM HG: CPT | Performed by: NURSE PRACTITIONER

## 2023-04-05 PROCEDURE — 3008F BODY MASS INDEX DOCD: CPT | Performed by: NURSE PRACTITIONER

## 2023-04-05 PROCEDURE — 3075F SYST BP GE 130 - 139MM HG: CPT | Performed by: NURSE PRACTITIONER

## 2023-04-05 PROCEDURE — 99213 OFFICE O/P EST LOW 20 MIN: CPT | Performed by: NURSE PRACTITIONER

## 2023-04-05 RX ORDER — SODIUM FLUORIDE 6.1 MG/ML
GEL, DENTIFRICE DENTAL
COMMUNITY
Start: 2023-02-13

## 2023-04-20 ENCOUNTER — OFFICE VISIT (OUTPATIENT)
Dept: INTERNAL MEDICINE CLINIC | Facility: CLINIC | Age: 63
End: 2023-04-20
Payer: COMMERCIAL

## 2023-04-20 VITALS
OXYGEN SATURATION: 98 % | BODY MASS INDEX: 40.3 KG/M2 | RESPIRATION RATE: 16 BRPM | HEIGHT: 74 IN | DIASTOLIC BLOOD PRESSURE: 80 MMHG | TEMPERATURE: 98 F | HEART RATE: 72 BPM | SYSTOLIC BLOOD PRESSURE: 128 MMHG | WEIGHT: 314 LBS

## 2023-04-20 DIAGNOSIS — E78.2 MIXED HYPERLIPIDEMIA: ICD-10-CM

## 2023-04-20 DIAGNOSIS — E11.65 TYPE 2 DIABETES MELLITUS WITH HYPERGLYCEMIA, WITHOUT LONG-TERM CURRENT USE OF INSULIN (HCC): Primary | ICD-10-CM

## 2023-04-20 DIAGNOSIS — I10 ESSENTIAL HYPERTENSION: ICD-10-CM

## 2023-04-20 LAB
CARTRIDGE LOT#: 30 NUMERIC
HEMOGLOBIN A1C: 9.1 % (ref 4.3–5.6)

## 2023-04-20 PROCEDURE — 3046F HEMOGLOBIN A1C LEVEL >9.0%: CPT | Performed by: FAMILY MEDICINE

## 2023-04-20 PROCEDURE — 83036 HEMOGLOBIN GLYCOSYLATED A1C: CPT | Performed by: FAMILY MEDICINE

## 2023-04-20 PROCEDURE — 99214 OFFICE O/P EST MOD 30 MIN: CPT | Performed by: FAMILY MEDICINE

## 2023-04-20 PROCEDURE — 3008F BODY MASS INDEX DOCD: CPT | Performed by: FAMILY MEDICINE

## 2023-04-20 PROCEDURE — 3079F DIAST BP 80-89 MM HG: CPT | Performed by: FAMILY MEDICINE

## 2023-04-20 PROCEDURE — 3074F SYST BP LT 130 MM HG: CPT | Performed by: FAMILY MEDICINE

## 2023-04-20 RX ORDER — PEN NEEDLE, DIABETIC 30 GX3/16"
1 NEEDLE, DISPOSABLE MISCELLANEOUS NIGHTLY
Qty: 90 EACH | Refills: 0 | Status: SHIPPED | OUTPATIENT
Start: 2023-04-20

## 2023-04-20 RX ORDER — INSULIN GLARGINE 100 [IU]/ML
15 INJECTION, SOLUTION SUBCUTANEOUS NIGHTLY
Qty: 13.5 ML | Refills: 0 | Status: SHIPPED | OUTPATIENT
Start: 2023-04-20 | End: 2023-07-19

## 2023-04-20 RX ORDER — BLOOD-GLUCOSE SENSOR
1 EACH MISCELLANEOUS
Qty: 6 EACH | Refills: 3 | Status: SHIPPED | OUTPATIENT
Start: 2023-04-20

## 2023-04-28 PROBLEM — J32.9 SINUSITIS, CHRONIC: Status: ACTIVE | Noted: 2017-04-02

## 2023-05-19 ENCOUNTER — TELEMEDICINE (OUTPATIENT)
Dept: INTERNAL MEDICINE CLINIC | Facility: CLINIC | Age: 63
End: 2023-05-19
Payer: COMMERCIAL

## 2023-05-19 DIAGNOSIS — I10 ESSENTIAL HYPERTENSION: ICD-10-CM

## 2023-05-19 DIAGNOSIS — E11.65 TYPE 2 DIABETES MELLITUS WITH HYPERGLYCEMIA, WITHOUT LONG-TERM CURRENT USE OF INSULIN (HCC): Primary | ICD-10-CM

## 2023-05-19 PROCEDURE — 99214 OFFICE O/P EST MOD 30 MIN: CPT | Performed by: FAMILY MEDICINE

## 2023-05-19 RX ORDER — FOSINOPRIL SODIUM 20 MG/1
20 TABLET ORAL DAILY
Qty: 90 TABLET | Refills: 1 | Status: SHIPPED | OUTPATIENT
Start: 2023-05-19

## 2023-05-19 RX ORDER — ROSUVASTATIN CALCIUM 10 MG/1
10 TABLET, COATED ORAL NIGHTLY
Qty: 90 TABLET | Refills: 1 | Status: SHIPPED | OUTPATIENT
Start: 2023-05-19

## 2023-05-19 NOTE — PROGRESS NOTES
Due to COVID-19 ACTION PLAN, the patient's office visit was converted to a video visit with informed patient consent. Time Spent: 20 min    Subjective     HPI:   Janie Grossman is a 58year old male who presents for follow-up. He has done well with Ozempic and restarted his insulin from last visit. His diet has suffered a bit due to travel. His average daily glucose is around 211 base on his CGM. REVIEW OF SYSTEMS:  GENERAL: Feels well otherwise. Physical Exam:  Appears well on exam.    Assessment and Plan:  Janie Grossman is presenting for follow-up. DM Type II  Will increase lantus to 20 Units nightly and continue current treatment. Continue monitoring with CGM and work on making dietary changes. Follow-up in 2 months with A1c in office.   - rosuvastatin 10 MG Oral Tab; Take 1 tablet (10 mg total) by mouth nightly. Dispense: 90 tablet; Refill: 1    Essential hypertension  Will continue current treatment. Refill sent. Follow-up in 2 months in office.   - Fosinopril Sodium 20 MG Oral Tab; Take 1 tablet (20 mg total) by mouth daily. Dispense: 90 tablet; Refill: 1      Kelly Byrne MD    Janie Grossman understands phone evaluation is not a substitute for face-to-face examination or emergency care. Patient advised to go to ER or call 911 for worsening symptoms or acute distress. Please note that the following visit was completed using two-way, real-time interactive video communication. This has been done in good clifton to provide continuity of care in the best interest of the provider-patient relationship, due to the on-going public health crisis/national emergency and because of restrictions of visitation. There are limitations of this visit as no physical exam could be performed. Every conscious effort was taken to allow for sufficient and adequate time. This billing visit was spent on reviewing labs, medications, radiology tests and decision making.   Appropriate medical decision-making and tests are ordered as detailed in the plan of care above.

## 2023-06-19 ENCOUNTER — HOSPITAL ENCOUNTER (OUTPATIENT)
Age: 63
Discharge: HOME | End: 2023-06-19
Payer: COMMERCIAL

## 2023-06-19 VITALS
HEART RATE: 87 BPM | SYSTOLIC BLOOD PRESSURE: 145 MMHG | BODY MASS INDEX: 39.78 KG/M2 | WEIGHT: 310 LBS | HEIGHT: 74 IN | DIASTOLIC BLOOD PRESSURE: 76 MMHG | RESPIRATION RATE: 18 BRPM | OXYGEN SATURATION: 97 % | TEMPERATURE: 99 F

## 2023-06-19 DIAGNOSIS — J01.20 ACUTE ETHMOIDAL SINUSITIS, RECURRENCE NOT SPECIFIED: Primary | ICD-10-CM

## 2023-06-19 PROCEDURE — 99213 OFFICE O/P EST LOW 20 MIN: CPT | Performed by: NURSE PRACTITIONER

## 2023-06-20 NOTE — ED NOTES
Pt called in to clarify whether he had been prescribed  an an antibiotic. I discussed pt's concerns with the provider. The provider prescribed Doxycycline 100 mg bid x10 days. The prescription was called to the pharmacy on file. Pt was notified.

## 2023-06-29 DIAGNOSIS — E11.65 TYPE 2 DIABETES MELLITUS WITH HYPERGLYCEMIA, WITHOUT LONG-TERM CURRENT USE OF INSULIN (HCC): ICD-10-CM

## 2023-06-29 RX ORDER — ROSUVASTATIN CALCIUM 10 MG/1
10 TABLET, COATED ORAL NIGHTLY
Qty: 90 TABLET | Refills: 1 | Status: CANCELLED | OUTPATIENT
Start: 2023-06-29

## 2023-06-30 RX ORDER — SODIUM FLUORIDE 6.1 MG/ML
GEL, DENTIFRICE DENTAL
Refills: 0 | OUTPATIENT
Start: 2023-06-30

## 2023-06-30 RX ORDER — SITAGLIPTIN AND METFORMIN HYDROCHLORIDE 1000; 50 MG/1; MG/1
1 TABLET, FILM COATED ORAL 2 TIMES DAILY WITH MEALS
Qty: 180 TABLET | Refills: 1 | Status: SHIPPED | OUTPATIENT
Start: 2023-06-30

## 2023-06-30 NOTE — TELEPHONE ENCOUNTER
Requested Prescriptions     Pending Prescriptions Disp Refills    SITagliptin-metFORMIN HCl (JANUMET)  MG Oral Tab 180 tablet 1     Sig: Take 1 tablet by mouth 2 (two) times daily with meals. PREVIDENT 5000 DRY MOUTH 1.1 % Dental Gel  0    semaglutide 2 MG/1.5ML Subcutaneous Solution Pen-injector 3 mL 0     Sig: Inject 0.25 mg into the skin once a week. rosuvastatin 10 MG Oral Tab 90 tablet 1     Sig: Take 1 tablet (10 mg total) by mouth nightly. LOV: 4/20/23    LAST PHYSICAL: 10/21/22    LAST REFILL:  Semaglutide- 4/20/23  Prevident- 2/13/23  Sitagliptin- 180- 10/21/22    LAST LAB: 1/18/23    Your Appointments      Thursday July 06, 2023  9:30 AM  EGD with Lake Shrestha MD  [facility] ([address] [place] [address]) [address] [id number]   Please arrive 60 minutes prior to your scheduled procedure time.

## 2023-07-06 PROBLEM — R13.10 DYSPHAGIA, UNSPECIFIED: Status: ACTIVE | Noted: 2023-07-06

## 2023-07-06 PROBLEM — K25.0 GASTRIC ULCER, ACUTE WITH HEMORRHAGE: Status: ACTIVE | Noted: 2023-07-06

## 2023-07-10 ENCOUNTER — TELEPHONE (OUTPATIENT)
Dept: INTERNAL MEDICINE CLINIC | Facility: CLINIC | Age: 63
End: 2023-07-10

## 2023-07-21 DIAGNOSIS — E11.65 TYPE 2 DIABETES MELLITUS WITH HYPERGLYCEMIA, WITHOUT LONG-TERM CURRENT USE OF INSULIN (HCC): ICD-10-CM

## 2023-07-21 RX ORDER — INSULIN GLARGINE-YFGN 100 [IU]/ML
15 INJECTION, SOLUTION SUBCUTANEOUS NIGHTLY
Qty: 15 ML | Refills: 0 | Status: SHIPPED | OUTPATIENT
Start: 2023-07-21

## 2023-07-21 NOTE — TELEPHONE ENCOUNTER
Future Appointments   Date Time Provider Dannie Clarke   8/22/2023  9:40 AM Harshal Zayas MD [facility]

## 2023-07-21 NOTE — TELEPHONE ENCOUNTER
Requested Prescriptions     Pending Prescriptions Disp Refills    INSULIN GLARGINE-YFGN 100 UNIT/ML Subcutaneous Solution Pen-injector [Pharmacy Med Name: INSULIN GLARG-YFGN 100U/ML PEN INJ] 15 mL 0     Sig: ADMINISTER 15 UNITS UNDER THE SKIN EVERY NIGHT       LOV: 5/19/23 [address]    LAST PHYSICAL: 10/21/22 [address]    LAST REFILL: unknown-another insulin has been used    LAST LAB: A1C 4/20/23 [address]- insulin glargine (LANTUS SOLOSTAR) 100 UNIT/ML Subcutaneous Solution Pen-injector; Inject 15 Units into the skin nightly. Dispense: 13.5 mL; Refill: 0  - Insulin Pen Needle (PEN NEEDLES) 32G X 4 MM Does not apply Misc; 1 each at bedtime. Dispense: 90 each;  Refill: 0

## 2023-07-22 DIAGNOSIS — E11.65 TYPE 2 DIABETES MELLITUS WITH HYPERGLYCEMIA, WITHOUT LONG-TERM CURRENT USE OF INSULIN (HCC): ICD-10-CM

## 2023-07-22 RX ORDER — INSULIN GLARGINE-YFGN 100 [IU]/ML
15 INJECTION, SOLUTION SUBCUTANEOUS NIGHTLY
Qty: 15 ML | Refills: 0 | OUTPATIENT
Start: 2023-07-22

## 2023-08-28 ENCOUNTER — OFFICE VISIT (OUTPATIENT)
Dept: INTERNAL MEDICINE CLINIC | Facility: CLINIC | Age: 63
End: 2023-08-28
Payer: COMMERCIAL

## 2023-08-28 VITALS
TEMPERATURE: 98 F | HEART RATE: 82 BPM | DIASTOLIC BLOOD PRESSURE: 72 MMHG | SYSTOLIC BLOOD PRESSURE: 136 MMHG | BODY MASS INDEX: 40.43 KG/M2 | WEIGHT: 315 LBS | HEIGHT: 74 IN

## 2023-08-28 DIAGNOSIS — R80.9 TYPE 2 DIABETES MELLITUS WITH MICROALBUMINURIA, WITHOUT LONG-TERM CURRENT USE OF INSULIN: Primary | ICD-10-CM

## 2023-08-28 DIAGNOSIS — E11.29 TYPE 2 DIABETES MELLITUS WITH MICROALBUMINURIA, WITHOUT LONG-TERM CURRENT USE OF INSULIN: Primary | ICD-10-CM

## 2023-08-28 DIAGNOSIS — I10 ESSENTIAL HYPERTENSION, BENIGN: ICD-10-CM

## 2023-08-28 DIAGNOSIS — J30.9 ALLERGIC RHINITIS, UNSPECIFIED SEASONALITY, UNSPECIFIED TRIGGER: ICD-10-CM

## 2023-08-28 LAB
CARTRIDGE LOT#: 507 NUMERIC
HEMOGLOBIN A1C: 7.6 % (ref 4.3–5.6)

## 2023-08-28 PROCEDURE — 99214 OFFICE O/P EST MOD 30 MIN: CPT | Performed by: FAMILY MEDICINE

## 2023-08-28 PROCEDURE — 83036 HEMOGLOBIN GLYCOSYLATED A1C: CPT | Performed by: FAMILY MEDICINE

## 2023-08-28 PROCEDURE — 3075F SYST BP GE 130 - 139MM HG: CPT | Performed by: FAMILY MEDICINE

## 2023-08-28 PROCEDURE — 3051F HG A1C>EQUAL 7.0%<8.0%: CPT | Performed by: FAMILY MEDICINE

## 2023-08-28 PROCEDURE — 3008F BODY MASS INDEX DOCD: CPT | Performed by: FAMILY MEDICINE

## 2023-08-28 PROCEDURE — 3078F DIAST BP <80 MM HG: CPT | Performed by: FAMILY MEDICINE

## 2023-08-28 RX ORDER — PEN NEEDLE, DIABETIC 30 GX3/16"
1 NEEDLE, DISPOSABLE MISCELLANEOUS NIGHTLY
Qty: 90 EACH | Refills: 0 | Status: SHIPPED | OUTPATIENT
Start: 2023-08-28

## 2023-08-28 RX ORDER — PIOGLITAZONEHYDROCHLORIDE 30 MG/1
30 TABLET ORAL DAILY
Qty: 90 TABLET | Refills: 1 | Status: SHIPPED | OUTPATIENT
Start: 2023-08-28

## 2023-08-28 NOTE — TELEPHONE ENCOUNTER
Appt 8/28/23    Diabetic Medication Protocol [id number]/25/2023 04:41 PM   Protocol Details Last HgBA1C < 7.5    HgBA1C procedure resulted in past 6 months    Microalbumin procedure in past 12 months or taking ACE/ARB    Appointment in past 6 or next 3 months     Pending lab order for A1C April, not completed        Component  Ref Range & Units 1/18/23 12:27 PM   HgbA1C  <5.7 % 9.3 High

## 2023-10-11 DIAGNOSIS — E11.65 TYPE 2 DIABETES MELLITUS WITH HYPERGLYCEMIA, WITHOUT LONG-TERM CURRENT USE OF INSULIN (HCC): ICD-10-CM

## 2023-10-12 RX ORDER — INSULIN GLARGINE-YFGN 100 [IU]/ML
20 INJECTION, SOLUTION SUBCUTANEOUS NIGHTLY
Qty: 15 ML | Refills: 0 | Status: SHIPPED | OUTPATIENT
Start: 2023-10-12 | End: 2024-01-10

## 2023-10-12 NOTE — TELEPHONE ENCOUNTER
Requested Prescriptions     Pending Prescriptions Disp Refills    Insulin Glargine-yfgn 100 UNIT/ML Subcutaneous Solution Pen-injector 15 mL 0     Sig: Inject 15 Units into the skin nightly.        LOV: 8/28/23    LAST PHYSICAL: 10/21/22    LAST REFILL: insulin pen injector-15mL-7/21/23    LAST LAB: 8/28/23    Your Appointments      Monday December 04, 2023  8:00 AM  Physical - Established with Dat Greer MD  [address], [address] [place] [address] ([facility]/ [facility]) [address] [place] [address]  [phone] Consent 3/Introductory Paragraph: I gave the patient a chance to ask questions they had about the procedure.  Following this I explained the Mohs procedure and consent was obtained. The risks, benefits and alternatives to therapy were discussed in detail. Specifically, the risks of infection, scarring, bleeding, prolonged wound healing, incomplete removal, allergy to anesthesia, nerve injury and recurrence were addressed. Prior to the procedure, the treatment site was clearly identified and confirmed by the patient. All components of Universal Protocol/PAUSE Rule completed.

## 2023-10-21 DIAGNOSIS — E11.65 TYPE 2 DIABETES MELLITUS WITH HYPERGLYCEMIA, WITHOUT LONG-TERM CURRENT USE OF INSULIN (HCC): ICD-10-CM

## 2023-10-23 RX ORDER — INSULIN GLARGINE-YFGN 100 [IU]/ML
20 INJECTION, SOLUTION SUBCUTANEOUS NIGHTLY
Qty: 15 ML | Refills: 0 | Status: SHIPPED | OUTPATIENT
Start: 2023-10-23

## 2023-10-23 NOTE — TELEPHONE ENCOUNTER
Future visit scheduled for 12/4/23    Last visit 8/28/23 with [facility] for DM visit   Type 2 diabetes mellitus with microalbuminuria  Significant improvement in his A1c from prior. Will continue current treatment for now and follow-up in 3 months for CPE. A1c in office at that time. - semaglutide 2 MG/3ML Subcutaneous Solution Pen-injector; Inject 0.25 mg into the skin once a week. Dispense: 6 mL; Refill: 0  - Insulin Pen Needle (PEN NEEDLES) 32G X 4 MM Does not apply Misc; 1 each at bedtime. Dispense: 90 each;  Refill: 0  - HEMOGLOBIN A1C

## 2023-11-29 ENCOUNTER — LAB ENCOUNTER (OUTPATIENT)
Dept: LAB | Age: 63
End: 2023-11-29
Attending: FAMILY MEDICINE
Payer: COMMERCIAL

## 2023-11-29 DIAGNOSIS — Z79.4 TYPE 2 DIABETES MELLITUS WITH MICROALBUMINURIA, WITH LONG-TERM CURRENT USE OF INSULIN (HCC): ICD-10-CM

## 2023-11-29 DIAGNOSIS — E11.29 TYPE 2 DIABETES MELLITUS WITH MICROALBUMINURIA, WITH LONG-TERM CURRENT USE OF INSULIN (HCC): ICD-10-CM

## 2023-11-29 DIAGNOSIS — R80.9 TYPE 2 DIABETES MELLITUS WITH MICROALBUMINURIA, WITH LONG-TERM CURRENT USE OF INSULIN (HCC): ICD-10-CM

## 2023-11-29 LAB
ALBUMIN SERPL-MCNC: 3.8 G/DL (ref 3.4–5)
ALBUMIN/GLOB SERPL: 1.2 {RATIO} (ref 1–2)
ALP LIVER SERPL-CCNC: 54 U/L
ALT SERPL-CCNC: 25 U/L
ANION GAP SERPL CALC-SCNC: 3 MMOL/L (ref 0–18)
AST SERPL-CCNC: 14 U/L (ref 15–37)
BILIRUB SERPL-MCNC: 0.7 MG/DL (ref 0.1–2)
BUN BLD-MCNC: 17 MG/DL (ref 9–23)
CALCIUM BLD-MCNC: 9 MG/DL (ref 8.5–10.1)
CHLORIDE SERPL-SCNC: 106 MMOL/L (ref 98–112)
CO2 SERPL-SCNC: 29 MMOL/L (ref 21–32)
CREAT BLD-MCNC: 0.86 MG/DL
EGFRCR SERPLBLD CKD-EPI 2021: 97 ML/MIN/1.73M2 (ref 60–?)
EST. AVERAGE GLUCOSE BLD GHB EST-MCNC: 189 MG/DL (ref 68–126)
FASTING STATUS PATIENT QL REPORTED: YES
GLOBULIN PLAS-MCNC: 3.2 G/DL (ref 2.8–4.4)
GLUCOSE BLD-MCNC: 144 MG/DL (ref 70–99)
HBA1C MFR BLD: 8.2 % (ref ?–5.7)
OSMOLALITY SERPL CALC.SUM OF ELEC: 290 MOSM/KG (ref 275–295)
POTASSIUM SERPL-SCNC: 4.4 MMOL/L (ref 3.5–5.1)
PROT SERPL-MCNC: 7 G/DL (ref 6.4–8.2)
SODIUM SERPL-SCNC: 138 MMOL/L (ref 136–145)

## 2023-11-29 PROCEDURE — 80053 COMPREHEN METABOLIC PANEL: CPT | Performed by: FAMILY MEDICINE

## 2023-11-29 PROCEDURE — 3052F HG A1C>EQUAL 8.0%<EQUAL 9.0%: CPT | Performed by: FAMILY MEDICINE

## 2023-11-29 PROCEDURE — 83036 HEMOGLOBIN GLYCOSYLATED A1C: CPT | Performed by: FAMILY MEDICINE

## 2023-12-04 ENCOUNTER — OFFICE VISIT (OUTPATIENT)
Dept: INTERNAL MEDICINE CLINIC | Facility: CLINIC | Age: 63
End: 2023-12-04
Payer: COMMERCIAL

## 2023-12-04 VITALS
WEIGHT: 315 LBS | HEART RATE: 84 BPM | HEIGHT: 73 IN | DIASTOLIC BLOOD PRESSURE: 72 MMHG | OXYGEN SATURATION: 98 % | TEMPERATURE: 98 F | SYSTOLIC BLOOD PRESSURE: 130 MMHG | RESPIRATION RATE: 18 BRPM | BODY MASS INDEX: 41.75 KG/M2

## 2023-12-04 DIAGNOSIS — E78.2 MIXED HYPERLIPIDEMIA: ICD-10-CM

## 2023-12-04 DIAGNOSIS — Z86.010 HISTORY OF COLON POLYPS: ICD-10-CM

## 2023-12-04 DIAGNOSIS — Z00.00 WELLNESS EXAMINATION: Primary | ICD-10-CM

## 2023-12-04 DIAGNOSIS — E11.29 TYPE 2 DIABETES MELLITUS WITH MICROALBUMINURIA, WITHOUT LONG-TERM CURRENT USE OF INSULIN: ICD-10-CM

## 2023-12-04 DIAGNOSIS — G47.33 SLEEP APNEA, OBSTRUCTIVE: ICD-10-CM

## 2023-12-04 DIAGNOSIS — R80.9 TYPE 2 DIABETES MELLITUS WITH MICROALBUMINURIA, WITHOUT LONG-TERM CURRENT USE OF INSULIN: ICD-10-CM

## 2023-12-04 DIAGNOSIS — J32.9 SINUSITIS, UNSPECIFIED CHRONICITY, UNSPECIFIED LOCATION: ICD-10-CM

## 2023-12-04 DIAGNOSIS — I10 ESSENTIAL HYPERTENSION, BENIGN: ICD-10-CM

## 2023-12-04 DIAGNOSIS — J45.20 MILD INTERMITTENT ASTHMA WITHOUT STATUS ASTHMATICUS WITHOUT COMPLICATION: ICD-10-CM

## 2023-12-04 PROCEDURE — 3075F SYST BP GE 130 - 139MM HG: CPT | Performed by: FAMILY MEDICINE

## 2023-12-04 PROCEDURE — 99396 PREV VISIT EST AGE 40-64: CPT | Performed by: FAMILY MEDICINE

## 2023-12-04 PROCEDURE — 3078F DIAST BP <80 MM HG: CPT | Performed by: FAMILY MEDICINE

## 2023-12-04 PROCEDURE — 90471 IMMUNIZATION ADMIN: CPT | Performed by: FAMILY MEDICINE

## 2023-12-04 PROCEDURE — 90686 IIV4 VACC NO PRSV 0.5 ML IM: CPT | Performed by: FAMILY MEDICINE

## 2023-12-04 PROCEDURE — 3008F BODY MASS INDEX DOCD: CPT | Performed by: FAMILY MEDICINE

## 2023-12-04 RX ORDER — PIOGLITAZONEHYDROCHLORIDE 30 MG/1
30 TABLET ORAL DAILY
Qty: 90 TABLET | Refills: 1 | Status: SHIPPED | OUTPATIENT
Start: 2023-12-04

## 2023-12-04 RX ORDER — PEN NEEDLE, DIABETIC 30 GX3/16"
1 NEEDLE, DISPOSABLE MISCELLANEOUS NIGHTLY
Qty: 100 EACH | Refills: 1 | Status: SHIPPED | OUTPATIENT
Start: 2023-12-04

## 2023-12-04 RX ORDER — DOXYCYCLINE HYCLATE 100 MG/1
100 CAPSULE ORAL 2 TIMES DAILY
Qty: 14 CAPSULE | Refills: 0 | Status: SHIPPED | OUTPATIENT
Start: 2023-12-04 | End: 2023-12-11

## 2023-12-04 NOTE — TELEPHONE ENCOUNTER
Last VISIT:12/04/2023 MD ALESSIO    Last CPE:12/04/2023    Last REFILL:08/28/2023 Pioglitazone #90 One Refills              Last LABS:11/29/2023    Future Appointments   Date Time Provider Dannie Tricia   3/4/2024  9:00 AM Orion Patricio MD [facility]         Per PROTOCOL? Non Protocol    Please Approve or Deny.

## 2023-12-21 DIAGNOSIS — I10 ESSENTIAL HYPERTENSION: ICD-10-CM

## 2023-12-21 DIAGNOSIS — E11.65 TYPE 2 DIABETES MELLITUS WITH HYPERGLYCEMIA, WITHOUT LONG-TERM CURRENT USE OF INSULIN (HCC): ICD-10-CM

## 2023-12-21 RX ORDER — INSULIN GLARGINE-YFGN 100 [IU]/ML
20 INJECTION, SOLUTION SUBCUTANEOUS NIGHTLY
Qty: 15 ML | Refills: 0 | Status: SHIPPED | OUTPATIENT
Start: 2023-12-21

## 2023-12-21 RX ORDER — FOSINOPRIL SODIUM 20 MG/1
20 TABLET ORAL DAILY
Qty: 90 TABLET | Refills: 1 | Status: SHIPPED | OUTPATIENT
Start: 2023-12-21

## 2023-12-21 NOTE — TELEPHONE ENCOUNTER
Last VISIT:12/04/2023 MD ALESSIO    Last CPE:12/04/2023    Last REFILL:05/19/2023   Medication Quantity Refills Start End   Fosinopril Sodium 20 MG Oral Tab 90 tablet 1     10/23/2023  Medication Quantity Refills Start End   Insulin Glargine-yfgn 100 UNIT/ML Subcutaneous Solution Pen-injector 15 mL 0     To pharmacy: ZERO refills remain on this prescription. Your patient is requesting advance approval of refills for this medication to [address]    Future Appointments   Date Time Provider Dannie Clarke   3/4/2024  9:00 AM Hussain Woodson MD [facility]         Per PROTOCOL? Non Protocol    Please Approve or Deny.

## 2024-01-15 RX ORDER — SITAGLIPTIN AND METFORMIN HYDROCHLORIDE 1000; 50 MG/1; MG/1
1 TABLET, FILM COATED ORAL 2 TIMES DAILY WITH MEALS
Qty: 180 TABLET | Refills: 1 | Status: SHIPPED | OUTPATIENT
Start: 2024-01-15

## 2024-01-15 NOTE — TELEPHONE ENCOUNTER
Last VISIT:12/04/2023 MD ALESSIO    Last CPE:12/04/2023    Last REFILL:06/30/2023   Medication Quantity Refills Start End   SITagliptin-metFORMIN HCl (JANUMET)  MG Oral Tab 180 tablet 1         Last LABS:11/29/2023    Future Appointments   Date Time Provider Department Center   3/4/2024  9:00 AM Michael Ashtno MD [facility]         Per PROTOCOL? Non Protocol    Please Approve or Deny.

## 2024-01-22 DIAGNOSIS — E11.65 TYPE 2 DIABETES MELLITUS WITH HYPERGLYCEMIA, WITHOUT LONG-TERM CURRENT USE OF INSULIN (HCC): ICD-10-CM

## 2024-01-23 RX ORDER — PIOGLITAZONEHYDROCHLORIDE 30 MG/1
30 TABLET ORAL DAILY
Qty: 90 TABLET | Refills: 1 | Status: SHIPPED | OUTPATIENT
Start: 2024-01-23

## 2024-01-23 RX ORDER — ROSUVASTATIN CALCIUM 10 MG/1
10 TABLET, COATED ORAL NIGHTLY
Qty: 90 TABLET | Refills: 1 | Status: SHIPPED | OUTPATIENT
Start: 2024-01-23

## 2024-01-23 NOTE — TELEPHONE ENCOUNTER
Last VISIT:12/04/2023 MD ALESSIO    Last CPE:12/04/2023    Last REFILL:05/19/2023   Medication Quantity Refills Start End   rosuvastatin 10 MG Oral Tab 90 tablet 1     12/04/2023   Medication Quantity Refills Start End   pioglitazone 30 MG Oral Tab 90 tablet 1         Last LABS:11/29/2023    Future Appointments   Date Time Provider Department Center   3/4/2024  9:00 AM Michael Ashton MD [facility]         Per PROTOCOL? Non Protocol    Please Approve or Deny.       Stage 2: Additional Anesthesia Type: 1% lidocaine with 1:100,000 epinephrine, 1% lidocaine without epinephrine and 8.4% sodium bicarbonate in a 2:2:1 Ratio

## 2024-02-15 DIAGNOSIS — E11.29 TYPE 2 DIABETES MELLITUS WITH MICROALBUMINURIA, WITHOUT LONG-TERM CURRENT USE OF INSULIN  (HCC): ICD-10-CM

## 2024-02-15 DIAGNOSIS — R80.9 TYPE 2 DIABETES MELLITUS WITH MICROALBUMINURIA, WITHOUT LONG-TERM CURRENT USE OF INSULIN  (HCC): ICD-10-CM

## 2024-02-16 DIAGNOSIS — E11.65 TYPE 2 DIABETES MELLITUS WITH HYPERGLYCEMIA, WITHOUT LONG-TERM CURRENT USE OF INSULIN (HCC): ICD-10-CM

## 2024-02-16 NOTE — TELEPHONE ENCOUNTER
Last VISIT:12/04/2023 MD ALESSIO    Last CPE:12/04/2023     Last REFILL:12/04/2023     Last LABS:11/29/2023    Future Appointments   Date Time Provider Department Center   3/4/2024  9:00 AM Michael Ashton MD [facility]         Per PROTOCOL? Non Protocol    Please Approve or Deny.

## 2024-02-17 RX ORDER — BLOOD-GLUCOSE SENSOR
1 EACH MISCELLANEOUS
Qty: 6 EACH | Refills: 3 | Status: SHIPPED | OUTPATIENT
Start: 2024-02-17

## 2024-02-17 NOTE — TELEPHONE ENCOUNTER
Last VISIT 12-04-23 MK physical    Last CPE 12-04-23    Last REFILL 04-20-23    Last LABS 11-29-23 a1c    Future Appointments   Date Time Provider Department Center   3/4/2024  9:00 AM Michael Ashton MD [facility]         Per PROTOCOL? Yes

## 2024-02-19 RX ORDER — PIOGLITAZONEHYDROCHLORIDE 30 MG/1
30 TABLET ORAL DAILY
Qty: 90 TABLET | Refills: 1 | Status: SHIPPED | OUTPATIENT
Start: 2024-02-19

## 2024-02-19 NOTE — TELEPHONE ENCOUNTER
Last VISIT 12/4/2023-[facility]    Last CPE 12/4/2023    Last REFILL  pioglitazone 30 MG Oral Tab 90 tablet 1 1/23/2024 --   Sig:   Take 1 tablet (30 mg total) by mouth daily.     Route:   Oral     Note to Pharmacy:   ZERO refills remain on this prescription. Your patient is requesting advance approval of refills for this medication to PREVENT ANY MISSED DOSES     Order #:   [id number]         Last LABS 11/29/2023    Future Appointments   Date Time Provider Department Center   3/4/2024  9:00 AM Michael Ashton MD [facility]         Per PROTOCOL?Failed    Please Approve or Deny.

## 2024-03-01 ENCOUNTER — LAB ENCOUNTER (OUTPATIENT)
Dept: LAB | Age: 64
End: 2024-03-01
Attending: FAMILY MEDICINE
Payer: COMMERCIAL

## 2024-03-01 DIAGNOSIS — E11.29 TYPE 2 DIABETES MELLITUS WITH MICROALBUMINURIA, WITHOUT LONG-TERM CURRENT USE OF INSULIN (HCC): ICD-10-CM

## 2024-03-01 DIAGNOSIS — R80.9 TYPE 2 DIABETES MELLITUS WITH MICROALBUMINURIA, WITHOUT LONG-TERM CURRENT USE OF INSULIN (HCC): ICD-10-CM

## 2024-03-01 LAB
ALBUMIN SERPL-MCNC: 3.6 G/DL (ref 3.4–5)
ALBUMIN/GLOB SERPL: 1 {RATIO} (ref 1–2)
ALP LIVER SERPL-CCNC: 62 U/L
ALT SERPL-CCNC: 26 U/L
ANION GAP SERPL CALC-SCNC: 3 MMOL/L (ref 0–18)
AST SERPL-CCNC: 14 U/L (ref 15–37)
BILIRUB SERPL-MCNC: 0.8 MG/DL (ref 0.1–2)
BUN BLD-MCNC: 16 MG/DL (ref 9–23)
CALCIUM BLD-MCNC: 9.1 MG/DL (ref 8.5–10.1)
CHLORIDE SERPL-SCNC: 105 MMOL/L (ref 98–112)
CHOLEST SERPL-MCNC: 105 MG/DL (ref ?–200)
CO2 SERPL-SCNC: 28 MMOL/L (ref 21–32)
CREAT BLD-MCNC: 0.84 MG/DL
CREAT UR-SCNC: 124 MG/DL
EGFRCR SERPLBLD CKD-EPI 2021: 98 ML/MIN/1.73M2 (ref 60–?)
EST. AVERAGE GLUCOSE BLD GHB EST-MCNC: 177 MG/DL (ref 68–126)
FASTING PATIENT LIPID ANSWER: YES
FASTING STATUS PATIENT QL REPORTED: YES
GLOBULIN PLAS-MCNC: 3.5 G/DL (ref 2.8–4.4)
GLUCOSE BLD-MCNC: 117 MG/DL (ref 70–99)
HBA1C MFR BLD: 7.8 % (ref ?–5.7)
HDLC SERPL-MCNC: 38 MG/DL (ref 40–59)
LDLC SERPL CALC-MCNC: 45 MG/DL (ref ?–100)
MICROALBUMIN UR-MCNC: 3.9 MG/DL
MICROALBUMIN/CREAT 24H UR-RTO: 31.5 UG/MG (ref ?–30)
NONHDLC SERPL-MCNC: 67 MG/DL (ref ?–130)
OSMOLALITY SERPL CALC.SUM OF ELEC: 284 MOSM/KG (ref 275–295)
POTASSIUM SERPL-SCNC: 4.1 MMOL/L (ref 3.5–5.1)
PROT SERPL-MCNC: 7.1 G/DL (ref 6.4–8.2)
SODIUM SERPL-SCNC: 136 MMOL/L (ref 136–145)
TRIGL SERPL-MCNC: 121 MG/DL (ref 30–149)
VLDLC SERPL CALC-MCNC: 17 MG/DL (ref 0–30)

## 2024-03-01 PROCEDURE — 82570 ASSAY OF URINE CREATININE: CPT

## 2024-03-01 PROCEDURE — 3051F HG A1C>EQUAL 7.0%<8.0%: CPT | Performed by: FAMILY MEDICINE

## 2024-03-01 PROCEDURE — 82043 UR ALBUMIN QUANTITATIVE: CPT

## 2024-03-01 PROCEDURE — 80053 COMPREHEN METABOLIC PANEL: CPT

## 2024-03-01 PROCEDURE — 3061F NEG MICROALBUMINURIA REV: CPT | Performed by: FAMILY MEDICINE

## 2024-03-01 PROCEDURE — 3060F POS MICROALBUMINURIA REV: CPT | Performed by: FAMILY MEDICINE

## 2024-03-01 PROCEDURE — 83036 HEMOGLOBIN GLYCOSYLATED A1C: CPT

## 2024-03-01 PROCEDURE — 80061 LIPID PANEL: CPT

## 2024-03-04 ENCOUNTER — OFFICE VISIT (OUTPATIENT)
Dept: INTERNAL MEDICINE CLINIC | Facility: CLINIC | Age: 64
End: 2024-03-04
Payer: COMMERCIAL

## 2024-03-04 VITALS
TEMPERATURE: 97 F | SYSTOLIC BLOOD PRESSURE: 124 MMHG | HEIGHT: 73 IN | WEIGHT: 315 LBS | OXYGEN SATURATION: 98 % | DIASTOLIC BLOOD PRESSURE: 68 MMHG | RESPIRATION RATE: 18 BRPM | BODY MASS INDEX: 41.75 KG/M2 | HEART RATE: 80 BPM

## 2024-03-04 DIAGNOSIS — E11.29 TYPE 2 DIABETES MELLITUS WITH MICROALBUMINURIA, WITHOUT LONG-TERM CURRENT USE OF INSULIN (HCC): Primary | ICD-10-CM

## 2024-03-04 DIAGNOSIS — E78.2 MIXED HYPERLIPIDEMIA: ICD-10-CM

## 2024-03-04 DIAGNOSIS — R80.9 TYPE 2 DIABETES MELLITUS WITH MICROALBUMINURIA, WITHOUT LONG-TERM CURRENT USE OF INSULIN (HCC): Primary | ICD-10-CM

## 2024-03-04 DIAGNOSIS — J32.9 CHRONIC SINUSITIS, UNSPECIFIED LOCATION: ICD-10-CM

## 2024-03-04 DIAGNOSIS — I10 ESSENTIAL HYPERTENSION, BENIGN: ICD-10-CM

## 2024-03-04 DIAGNOSIS — G47.33 OSA ON CPAP: ICD-10-CM

## 2024-03-04 PROCEDURE — 90715 TDAP VACCINE 7 YRS/> IM: CPT | Performed by: FAMILY MEDICINE

## 2024-03-04 PROCEDURE — 3078F DIAST BP <80 MM HG: CPT | Performed by: FAMILY MEDICINE

## 2024-03-04 PROCEDURE — 3074F SYST BP LT 130 MM HG: CPT | Performed by: FAMILY MEDICINE

## 2024-03-04 PROCEDURE — 90471 IMMUNIZATION ADMIN: CPT | Performed by: FAMILY MEDICINE

## 2024-03-04 PROCEDURE — 3008F BODY MASS INDEX DOCD: CPT | Performed by: FAMILY MEDICINE

## 2024-03-04 PROCEDURE — 99214 OFFICE O/P EST MOD 30 MIN: CPT | Performed by: FAMILY MEDICINE

## 2024-03-04 RX ORDER — DOXYCYCLINE HYCLATE 100 MG/1
100 CAPSULE ORAL 2 TIMES DAILY
Qty: 20 CAPSULE | Refills: 0 | Status: SHIPPED | OUTPATIENT
Start: 2024-03-04 | End: 2024-03-14

## 2024-03-04 NOTE — PROGRESS NOTES
Tye Sylvester  9/9/1960    Chief Complaint   Patient presents with    Follow - Up     BERTA RM 8- Pt is here for 3 mos f/u       HPI:   Tye Sylvester is a 63 year old male who presents for follow-up. He overall has been stable on his current treatment. Tolerating his Ozempic well. He needs follow-up with pulm regarding his CPAP.     Current Outpatient Medications   Medication Sig Dispense Refill    pioglitazone 30 MG Oral Tab Take 1 tablet (30 mg total) by mouth daily. 90 tablet 1    Continuous Blood Gluc Sensor (FREESTYLE CELESTINE 3 SENSOR) Does not apply Misc 1 each every 14 (fourteen) days. Apply sensor every 14 days 6 each 3    semaglutide 2 MG/3ML Subcutaneous Solution Pen-injector Inject 0.5 mg into the skin once a week. 9 mL 0    pantoprazole 20 MG Oral Tab EC Take 1 tablet (20 mg total) by mouth before breakfast. 90 tablet 0    rosuvastatin 10 MG Oral Tab Take 1 tablet (10 mg total) by mouth nightly. 90 tablet 1    SITagliptin-metFORMIN HCl (JANUMET)  MG Oral Tab Take 1 tablet by mouth 2 (two) times daily with meals. 180 tablet 1    Fosinopril Sodium 20 MG Oral Tab Take 1 tablet (20 mg total) by mouth daily. 90 tablet 1    Insulin Glargine-yfgn 100 UNIT/ML Subcutaneous Solution Pen-injector Inject 20 Units into the skin nightly. 15 mL 0    Insulin Pen Needle (PEN NEEDLES) 32G X 4 MM Does not apply Misc 1 each at bedtime. 100 each 1    PREVIDENT 5000 DRY MOUTH 1.1 % Dental Gel       Fluticasone Propionate 50 MCG/ACT Nasal Suspension Two sprays in each nostril once daily 1 Bottle 6      Allergies   Allergen Reactions    Penicillins HIVES    Cephalosporins UNKNOWN      Past Medical History:   Diagnosis Date    Allergic rhinitis     Arthritis     Diabetes (HCC)     Diabetes mellitus (HCC)     Essential hypertension     High cholesterol     History of colon polyps     Hyperlipidemia     Hypertension     Irregular bowel habits     Obesity     MADY (obstructive sleep apnea)     on CPAP 9cm    Wears  glasses     Weight gain December 2023    After taking Type 2 Diabetes Medicines      Patient Active Problem List   Diagnosis    History of colon polyps    Allergic rhinitis    Sleep apnea, obstructive    Obesity, unspecified    Essential hypertension, benign    Mixed hyperlipidemia    Type 2 diabetes mellitus with microalbuminuria, without long-term current use of insulin (HCC)    Sinusitis, chronic    History of adenomatous polyp of colon    Abscess of anal and rectal regions    Anal fistula    Asthma without status asthmaticus (HCC)    Dysphagia, unspecified    Gastric ulcer, acute with hemorrhage      Past Surgical History:   Procedure Laterality Date    COLONOSCOPY  2008    COLONOSCOPY  01/11/2017    adenomatous polyp, diverticuli and int hem    OTHER SURGICAL HISTORY      debviated septum, nasal surgery    OTHER SURGICAL HISTORY      arthroscopic knee surgery right      Family History   Problem Relation Age of Onset    Prostate Cancer Father     Diabetes Mother     Hypertension Mother       Social History     Socioeconomic History    Marital status:     Number of children: 2   Occupational History    Occupation: [employer]    Tobacco Use    Smoking status: Never    Smokeless tobacco: Never   Vaping Use    Vaping Use: Never used   Substance and Sexual Activity    Alcohol use: Yes     Alcohol/week: 1.0 standard drink of alcohol     Types: 1 Glasses of wine per week     Comment: CAGE 8/3/20    Drug use: Never    Sexual activity: Not Currently         REVIEW OF SYSTEMS:   GENERAL: feels well otherwise, no fever or chills.     EXAM:   /68   Pulse 80   Temp 97 °F (36.1 °C) (Temporal)   Resp 18   Ht 6' 1\" (1.854 m)   Wt (!) 326 lb 9.6 oz (148.1 kg)   SpO2 98%   BMI 43.09 kg/m²   GENERAL: Well developed, well nourished,in no apparent distress  SKIN: No rash  LUNGS: clear to auscultation  CARDIO: RRR without murmur  Bilateral barefoot skin diabetic exam is normal, visualized feet and  the appearance is normal.  Bilateral monofilament/sensation of both feet is normal.  Pulsation pedal pulse exam of both lower legs/feet is normal as well.       ASSESSMENT AND PLAN:   Tye Sylvester is a 63 year old male who presents for follow-up    Type 2 diabetes mellitus with microalbuminuria  A1c improved from prior. Will increase Ozempic to 1 mg weekly and continue treatment otherwise. Continue to work on dietary optimization. Repeat labs prior to DM VV f/u in 3 months.  - semaglutide 4 MG/3ML Subcutaneous Solution Pen-injector; Inject 1 mg into the skin once a week.  Dispense: 9 mL; Refill: 0  - CMP in 3 months; Future  - Hemoglobin A1C in 3 months; Future    Mixed hyperlipidemia  LDL at goal.     Essential hypertension, benign  Controlled on current treatment.     Chronic sinusitis, unspecified location  Sinusitis symptoms returned. Will begin antibiotic course and recommended evaluation with ENT>   - doxycycline 100 MG Oral Cap; Take 1 capsule (100 mg total) by mouth 2 (two) times daily for 10 days.  Dispense: 20 capsule; Refill: 0  - ENT - INTERNAL    MADY on CPAP  Referral placed for pulmonology follow-up  - Pulmonary Referral - In Network    F/U in 3 months for VV    All questions were answered and the patient agrees with the plan.     Thank you,  Michael Ashton MD

## 2024-03-29 DIAGNOSIS — E11.65 TYPE 2 DIABETES MELLITUS WITH HYPERGLYCEMIA, WITHOUT LONG-TERM CURRENT USE OF INSULIN (HCC): ICD-10-CM

## 2024-03-30 RX ORDER — INSULIN GLARGINE-YFGN 100 [IU]/ML
20 INJECTION, SOLUTION SUBCUTANEOUS NIGHTLY
Qty: 15 ML | Refills: 0 | Status: SHIPPED | OUTPATIENT
Start: 2024-03-30

## 2024-03-30 NOTE — TELEPHONE ENCOUNTER
Last VISIT 03-04-24 MK dm     Last CPE 12-04-23     Last REFILL 12-21-23     Last LABS 03-01-24 A1c, lipid, cmp    Future Appointments   Date Time Provider Department Center   6/3/2024  3:00 PM Michael Ashton MD [facility]         Per PROTOCOL? no    Please Approve or Deny.

## 2024-04-24 DIAGNOSIS — E11.65 TYPE 2 DIABETES MELLITUS WITH HYPERGLYCEMIA, WITHOUT LONG-TERM CURRENT USE OF INSULIN (HCC): ICD-10-CM

## 2024-04-25 RX ORDER — ROSUVASTATIN CALCIUM 10 MG/1
10 TABLET, COATED ORAL NIGHTLY
Qty: 90 TABLET | Refills: 0 | Status: SHIPPED | OUTPATIENT
Start: 2024-04-25

## 2024-05-13 DIAGNOSIS — E11.29 TYPE 2 DIABETES MELLITUS WITH MICROALBUMINURIA, WITHOUT LONG-TERM CURRENT USE OF INSULIN (HCC): ICD-10-CM

## 2024-05-13 DIAGNOSIS — R80.9 TYPE 2 DIABETES MELLITUS WITH MICROALBUMINURIA, WITHOUT LONG-TERM CURRENT USE OF INSULIN (HCC): ICD-10-CM

## 2024-06-04 DIAGNOSIS — I10 ESSENTIAL HYPERTENSION: ICD-10-CM

## 2024-06-09 DIAGNOSIS — E11.65 TYPE 2 DIABETES MELLITUS WITH HYPERGLYCEMIA, WITHOUT LONG-TERM CURRENT USE OF INSULIN (HCC): ICD-10-CM

## 2024-06-10 RX ORDER — FOSINOPRIL SODIUM 20 MG/1
20 TABLET ORAL DAILY
Qty: 90 TABLET | Refills: 3 | Status: SHIPPED | OUTPATIENT
Start: 2024-06-10

## 2024-06-10 NOTE — TELEPHONE ENCOUNTER
REFILL PASSED PER [facility] PROTOCOLS    Requested Prescriptions   Pending Prescriptions Disp Refills    FOSINOPRIL SODIUM 20 MG Oral Tab [Pharmacy Med Name: FOSINOPRIL 20MG TABLETS] 90 tablet 1     Sig: TAKE 1 TABLET(20 MG) BY MOUTH DAILY       Hypertension Medications Protocol Passed - 6/4/2024  3:01 PM        Passed - CMP or BMP in past 12 months        Passed - Last BP reading less than 140/90     BP Readings from Last 1 Encounters:   03/04/24 124/68               Passed - In person appointment or virtual visit in the past 12 mos or appointment in next 3 mos     Recent Outpatient Visits              3 months ago Type 2 diabetes mellitus with microalbuminuria, without long-term current use of insulin ([facility])    [address]    Office Visit    6 months ago Wellness examination    [address]    Office Visit    9 months ago Type 2 diabetes mellitus with microalbuminuria, without long-term current use of insulin ([facility])    [address]    Office Visit    1 year ago Type 2 diabetes mellitus with hyperglycemia, without long-term current use of insulin ([facility])    [address] Michael Anaya MD    Telemedicine    1 year ago Dysphagia, unspecified type    [facility] G[facility]enterology,  [facility] AydinHaresh MD    Virtual Phone E/M          Future Appointments         Provider Department Appt Notes    In 3 weeks Serafin Howe MD [facility], [facility] Consult: \"Need prescription for new CPAP as mine is intermittently failing.\" > MCM sent for prior MR or pt may need updated testing. Will wait-list once reply is rec'd.    In 1 month Himanshu Miramontes MD [facility], [facility] Chronic sinusitis, unspecified location    In 1  month Michael Ashton MD [address] virtual 3 month f/u                    Passed - EGFRCR or GFRNAA > 50     GFR Evaluation  EGFRCR: 98 , resulted on 3/1/2024               Future Appointments         Provider Department Appt Notes    In 3 weeks Serafin Howe MD [facility], [facility] Consult: \"Need prescription for new CPAP as mine is intermittently failing.\" > MCM sent for prior MR or pt may need updated testing. Will wait-list once reply is rec'd.    In 1 month Himanshu Miramontes MD [facility], [facility] Chronic sinusitis, unspecified location    In 1 month Michael Ashton MD [address] virtual 3 month f/u          Recent Outpatient Visits              3 months ago Type 2 diabetes mellitus with microalbuminuria, without long-term current use of insulin ([facility])    [address] MD    Office Visit    6 months ago Wellness examination    [address]    Office Visit    9 months ago Type 2 diabetes mellitus with microalbuminuria, without long-term current use of insulin ([facility])    [address]    Office Visit    1 year ago Type 2 diabetes mellitus with hyperglycemia, without long-term current use of insulin ([facility])    [address] Michael Ashton MD    Telemedicine    1 year ago Dysphagia, unspecified type    [facility] Gastroenterology,  [facility] Haresh Perrin MD    Virtual Phone E/M

## 2024-06-13 RX ORDER — INSULIN GLARGINE-YFGN 100 [IU]/ML
20 INJECTION, SOLUTION SUBCUTANEOUS NIGHTLY
Qty: 21 ML | Refills: 3 | Status: SHIPPED | OUTPATIENT
Start: 2024-06-13

## 2024-06-13 NOTE — TELEPHONE ENCOUNTER
Tye Sylvester requesting Medication Refill for:    Medication name and dose (copy and paste from medication list):   Medication Quantity Refills Start End   Insulin Glargine-yfgn 100 UNIT/ML Subcutaneous Solution Pen-injector 15 mL 0 3/30/2024 --   Sig:   Inject 20 Units into the skin nightly.     Route:   Subcutaneous     Note to Pharmacy:   ZERO refills remain on this prescription. Your patient is requesting advance approval of refills for this medication to PREVENT ANY MISSED DOSES     Order #:   [id number]         If medication is not on medication list - transfer patient to RN queue for triage    Preferred Pharmacy:   [id number] - [place], [place] - [facility] [address], [phone], [phone]   [facility] [address] [place] [address]   Phone: [phone] Fax: [phone]   Hours: Not open 24 hours       LOV: 3/4/2024   Last Refill date: 03/30/24  Next Scheduled appointment: 7/26/2024

## 2024-06-13 NOTE — TELEPHONE ENCOUNTER
Please review. Protocol Failed; No Protocol    Requested Prescriptions   Pending Prescriptions Disp Refills    INSULIN GLARGINE-YFGN 100 UNIT/ML Subcutaneous Solution Pen-injector [Pharmacy Med Name: INSULIN GLARG-YFGN 100U/ML PEN INJ] 15 mL 0     Sig: ADMINISTER 20 UNITS UNDER THE SKIN EVERY NIGHT       Diabetes Medication Protocol Failed - 6/9/2024  4:04 PM        Failed - Last A1C < 7.5 and within past 6 months     Lab Results   Component Value Date    A1C 7.8 (H) 03/01/2024             Passed - In person appointment or virtual visit in the past 6 mos or appointment in next 3 mos     Recent Outpatient Visits              3 months ago Type 2 diabetes mellitus with microalbuminuria, without long-term current use of insulin ([facility])    [address] Michael Anaya MD    Office Visit    6 months ago Wellness examination    [address] Michael Anaya MD    Office Visit    9 months ago Type 2 diabetes mellitus with microalbuminuria, without long-term current use of insulin ([facility])    [address] Michael Anaya MD    Office Visit    1 year ago Type 2 diabetes mellitus with hyperglycemia, without long-term current use of insulin ([facility])    [address] Michael Anaya MD    Telemedicine    1 year ago Dysphagia, unspecified type    [facility] Gastroenterology,  [facility] AydinHaresh MD    Virtual Phone E/M          Future Appointments         Provider Department Appt Notes    In 2 weeks Serafin Howe MD [facility], [facility] Consult: \"Need prescription for new CPAP as mine is intermittently failing.\" > MCM sent for prior MR or pt may need updated testing. Will wait-list once reply is rec'd.    In 4 weeks Himanshu Miramontes MD [facility], [facility] Chronic sinusitis, unspecified  location    In 1 month Michael Ashton MD [facility], [address] [place] [address] virtual 3 month f/u                    Passed - Microalbumin procedure in past 12 months or taking ACE/ARB        Passed - EGFRCR or GFRNAA > 50     GFR Evaluation  EGFRCR: 98 , resulted on 3/1/2024          Passed - GFR in the past 12 months               Future Appointments         Provider Department Appt Notes    In 2 weeks Serafin Howe MD [facility], [facility] Consult: \"Need prescription for new CPAP as mine is intermittently failing.\" > MCM sent for prior MR or pt may need updated testing. Will wait-list once reply is rec'd.    In 4 weeks Himanshu Miramontes MD [facility], [facility] Chronic sinusitis, unspecified location    In 1 month Michael Ashton MD [address] virtual 3 month f/u          Recent Outpatient Visits              3 months ago Type 2 diabetes mellitus with microalbuminuria, without long-term current use of insulin ([facility])    [address]    Office Visit    6 months ago Wellness examination    [address]    Office Visit    9 months ago Type 2 diabetes mellitus with microalbuminuria, without long-term current use of insulin ([facility])    [address] MD    Office Visit    1 year ago Type 2 diabetes mellitus with hyperglycemia, without long-term current use of insulin ([facility])    [address]    Telemedicine    1 year ago Dysphagia, unspecified type    [facility] Gastroenterology,  [facility] Haresh Perrin MD    Virtual Phone E/M

## 2024-06-21 ENCOUNTER — TELEPHONE (OUTPATIENT)
Facility: CLINIC | Age: 64
End: 2024-06-21

## 2024-06-21 NOTE — TELEPHONE ENCOUNTER
LVM to request pt send or locate prior Sleep Studies or confirm we are seeking to get new testing performed.

## 2024-07-12 ENCOUNTER — OFFICE VISIT (OUTPATIENT)
Facility: LOCATION | Age: 64
End: 2024-07-12
Payer: COMMERCIAL

## 2024-07-12 DIAGNOSIS — J32.4 CHRONIC PANSINUSITIS: Primary | ICD-10-CM

## 2024-07-12 PROCEDURE — 99204 OFFICE O/P NEW MOD 45 MIN: CPT | Performed by: OTOLARYNGOLOGY

## 2024-07-12 PROCEDURE — 31231 NASAL ENDOSCOPY DX: CPT | Performed by: OTOLARYNGOLOGY

## 2024-07-12 NOTE — PROGRESS NOTES
Tye Sylvester is a 63 year old male. No chief complaint on file.    HPI:   He has a history right-sided sinus surgery 30 years ago.  Over the past few years he has had blockage on the right-hand side along with thick drainage and headache.  He has tried Flonase.  He had allergy shots as a child.  Current Outpatient Medications   Medication Sig Dispense Refill    Insulin Glargine-yfgn 100 UNIT/ML Subcutaneous Solution Pen-injector Inject 20 Units into the skin nightly. 21 mL 3    Fosinopril Sodium 20 MG Oral Tab Take 1 tablet (20 mg total) by mouth daily. 90 tablet 3    pantoprazole 20 MG Oral Tab EC Take 1 tablet (20 mg total) by mouth before breakfast. 90 tablet 0    rosuvastatin 10 MG Oral Tab Take 1 tablet (10 mg total) by mouth nightly. 90 tablet 0    semaglutide 4 MG/3ML Subcutaneous Solution Pen-injector Inject 1 mg into the skin once a week. 9 mL 0    pioglitazone 30 MG Oral Tab Take 1 tablet (30 mg total) by mouth daily. 90 tablet 1    Continuous Blood Gluc Sensor (uTestSTYLE CELESTINE 3 SENSOR) Does not apply Misc 1 each every 14 (fourteen) days. Apply sensor every 14 days 6 each 3    SITagliptin-metFORMIN HCl (JANUMET)  MG Oral Tab Take 1 tablet by mouth 2 (two) times daily with meals. 180 tablet 1    Insulin Pen Needle (PEN NEEDLES) 32G X 4 MM Does not apply Misc 1 each at bedtime. 100 each 1    PREVIDENT 5000 DRY MOUTH 1.1 % Dental Gel       Fluticasone Propionate 50 MCG/ACT Nasal Suspension Two sprays in each nostril once daily 1 Bottle 6      Past Medical History:    Allergic rhinitis    Arthritis    Diabetes (HCC)    Diabetes mellitus (HCC)    Essential hypertension    High cholesterol    History of colon polyps    Hyperlipidemia    Hypertension    Irregular bowel habits    Obesity    MADY (obstructive sleep apnea)    on CPAP 9cm    Wears glasses    Weight gain    After taking Type 2 Diabetes Medicines      Social History:  Social History     Socioeconomic History    Marital status:      Number of children: 2   Occupational History    Occupation: [employer]    Tobacco Use    Smoking status: Never    Smokeless tobacco: Never   Vaping Use    Vaping status: Never Used   Substance and Sexual Activity    Alcohol use: Yes     Alcohol/week: 1.0 standard drink of alcohol     Types: 1 Glasses of wine per week     Comment: CAGE 8/3/20    Drug use: Never    Sexual activity: Not Currently      Past Surgical History:   Procedure Laterality Date    Colonoscopy  2008    Colonoscopy  01/11/2017    adenomatous polyp, diverticuli and int hem    Other surgical history      debviated septum, nasal surgery    Other surgical history      arthroscopic knee surgery right         REVIEW OF SYSTEMS:   GENERAL HEALTH: feels well otherwise  GENERAL : denies fever, chills, sweats, weight loss, weight gain  SKIN: denies any unusual skin lesions or rashes  RESPIRATORY: denies shortness of breath with exertion  NEURO: denies headaches    EXAM:   There were no vitals taken for this visit.    System Findings Details   Constitutional  Overall appearance - Normal.   Psychiatric  Orientation - Oriented to time, place, person & situation. Appropriate mood and affect.   Head/Face  Facial features -- Normal. Skull - Normal.   Eyes  Pupils equal ,round ,react to light and accomidate   Ears, Nose, Throat, Neck  Ears clear nose congestion oropharynx cobblestoning neck no masses   Neurological  Memory - Normal. Cranial nerves - Cranial nerves II through XII grossly intact.   Lymph Detail  Submental. Submandibular. Anterior cervical. Posterior cervical. Supraclavicular.   Procedure:  Due to inability for adequate examination of the nasal cavity and nasopharynx and need for magnification to perform the examination, endoscopy was offered.  The nasal endoscope was utilized as it was imperative to inspect the interior of the nasal cavity and the middle and superior meatus along with the turbinates and sphenoethmoid recess.  Risks and  benefits were discussed with patient/family and they have given consent to proceed. A rigid zero degree scope was inserted.    Findings:  The anterior of the nasal cavity along with the middle and superior meatus and turbinates and the sphenoethmoid recess were evaluated.  There is some septal deviation present.  There is narrowing at the ostiomeatal unit right greater than left.  There is some discharge on the right-hand side.  I do not necessarily see polyps but I cannot see all the way back on the right due to narrowing.    ASSESSMENT AND PLAN:   1. Chronic pansinusitis  He is status post sinus surgery on the right.  He also has a history of bad allergies as a child and had allergy shots.  I am concerned that allergies are playing a role in his adult problems.  He will continue with Flonase.  He will add Allegra or Zyrtec.  He will undergo a dedicated CT scan of the sinus and then I will see him back and make further recommendations.  - CT SINUS [facility] ENT (CPT=70486); Future      The patient indicates understanding of these issues and agrees to the plan.    No follow-ups on file.    Himanshu Miramontes MD  7/12/2024  9:16 AM   pt will speak with the surgeon and the  anesthesiologist preop

## 2024-07-26 ENCOUNTER — HOSPITAL ENCOUNTER (OUTPATIENT)
Dept: CT IMAGING | Facility: HOSPITAL | Age: 64
Discharge: HOME | End: 2024-07-26
Attending: OTOLARYNGOLOGY
Payer: COMMERCIAL

## 2024-07-26 DIAGNOSIS — J32.4 CHRONIC PANSINUSITIS: ICD-10-CM

## 2024-07-26 PROCEDURE — 70486 CT MAXILLOFACIAL W/O DYE: CPT | Performed by: OTOLARYNGOLOGY

## 2024-07-30 RX ORDER — CLARITHROMYCIN 500 MG/1
500 TABLET, COATED ORAL 2 TIMES DAILY
Qty: 20 TABLET | Refills: 0 | Status: SHIPPED | OUTPATIENT
Start: 2024-07-30

## 2024-07-30 RX ORDER — PREDNISONE 5 MG/1
5 TABLET ORAL DAILY
Qty: 10 TABLET | Refills: 0 | Status: SHIPPED | OUTPATIENT
Start: 2024-07-30

## 2024-07-30 NOTE — PROGRESS NOTES
I reviewed CT scan with patient.  It does not look that bad however continues to act up thick discharge that is sometimes yellow.  I will place him on Biaxin and prednisone.  He is also to call and make a follow-up appointment.

## 2024-08-28 DIAGNOSIS — I10 ESSENTIAL HYPERTENSION: ICD-10-CM

## 2024-08-28 DIAGNOSIS — E11.65 TYPE 2 DIABETES MELLITUS WITH HYPERGLYCEMIA, WITHOUT LONG-TERM CURRENT USE OF INSULIN (HCC): ICD-10-CM

## 2024-08-30 DIAGNOSIS — R80.9 TYPE 2 DIABETES MELLITUS WITH MICROALBUMINURIA, WITHOUT LONG-TERM CURRENT USE OF INSULIN (HCC): ICD-10-CM

## 2024-08-30 DIAGNOSIS — E11.29 TYPE 2 DIABETES MELLITUS WITH MICROALBUMINURIA, WITHOUT LONG-TERM CURRENT USE OF INSULIN (HCC): ICD-10-CM

## 2024-08-30 RX ORDER — ROSUVASTATIN CALCIUM 10 MG/1
10 TABLET, COATED ORAL NIGHTLY
Qty: 90 TABLET | Refills: 3 | Status: SHIPPED | OUTPATIENT
Start: 2024-08-30

## 2024-08-30 RX ORDER — FOSINOPRIL SODIUM 20 MG/1
20 TABLET ORAL DAILY
Qty: 90 TABLET | Refills: 3 | OUTPATIENT
Start: 2024-08-30

## 2024-08-30 RX ORDER — PIOGLITAZONEHYDROCHLORIDE 30 MG/1
30 TABLET ORAL DAILY
Qty: 90 TABLET | Refills: 3 | Status: SHIPPED | OUTPATIENT
Start: 2024-08-30

## 2024-08-30 NOTE — TELEPHONE ENCOUNTER
Please review; protocol failed/No Protocol  Requested Prescriptions   Pending Prescriptions Disp Refills    pioglitazone 30 MG Oral Tab 90 tablet 1     Sig: Take 1 tablet (30 mg total) by mouth daily.       Diabetes Medication Protocol Failed - 8/28/2024  9:53 AM        Failed - Last A1C < 7.5 and within past 6 months     Lab Results   Component Value Date    A1C 7.8 (H) 03/01/2024             Passed - In person appointment or virtual visit in the past 6 mos or appointment in next 3 mos     Recent Outpatient Visits              1 month ago Chronic pansinusitis    [facility], [facility] [place], Himanshu Montalvo MD    Office Visit    5 months ago Type 2 diabetes mellitus with microalbuminuria, without long-term current use of insulin ([facility])    [address] Michael Anaya MD    Office Visit    9 months ago Wellness examination    [address] Michael, MD    Office Visit    1 year ago Type 2 diabetes mellitus with microalbuminuria, without long-term current use of insulin ([facility])    [address], MD    Office Visit    1 year ago Type 2 diabetes mellitus with hyperglycemia, without long-term current use of insulin ([facility])    [address] Michael Anaya MD    Telemedicine          Future Appointments         Provider Department Appt Notes    In 2 weeks Michael Ashton MD [address] virtual 3 month f/u                    Passed - Microalbumin procedure in past 12 months or taking ACE/ARB        Passed - EGFRCR or GFRNAA > 50     GFR Evaluation  EGFRCR: 98 , resulted on 3/1/2024          Passed - GFR in the past 12 months         Signed Prescriptions Disp Refills    rosuvastatin 10 MG Oral Tab 90 tablet 3     Sig: Take 1 tablet (10 mg total) by mouth nightly.        Cholesterol Medication Protocol Passed - 8/28/2024  9:53 AM        Passed - ALT < 80     Lab Results   Component Value Date    ALT 26 03/01/2024             Passed - ALT resulted within past year        Passed - Lipid panel within past 12 months     Lab Results   Component Value Date    CHOLEST 105 03/01/2024    TRIG 121 03/01/2024    HDL 38 (L) 03/01/2024    LDL 45 03/01/2024    VLDL 17 03/01/2024    NONHDLC 67 03/01/2024             Passed - In person appointment or virtual visit in the past 12 mos or appointment in next 3 mos     Recent Outpatient Visits              1 month ago Chronic pansinusitis    [facility], Three [facility], Himanshu Montalvo MD    Office Visit    5 months ago Type 2 diabetes mellitus with microalbuminuria, without long-term current use of insulin ([facility])    [address] Michael Anaya MD    Office Visit    9 months ago Wellness examination    [address] Michael Anaya MD    Office Visit    1 year ago Type 2 diabetes mellitus with microalbuminuria, without long-term current use of insulin ([facility])    [address] Michael Anaya MD    Office Visit    1 year ago Type 2 diabetes mellitus with hyperglycemia, without long-term current use of insulin ([facility])    [address], MD    Telemedicine          Future Appointments         Provider Department Appt Notes    In 2 weeks Michael Ashton MD [address] virtual 3 month f/u                     Refused Prescriptions Disp Refills    Fosinopril Sodium 20 MG Oral Tab 90 tablet 3     Sig: Take 1 tablet (20 mg total) by mouth daily.       Hypertension Medications Protocol Passed - 8/28/2024  9:53 AM        Passed - CMP or BMP in past 12 months        Passed - Last BP reading less than 140/90     BP  Readings from Last 1 Encounters:   03/04/24 124/68               Passed - In person appointment or virtual visit in the past 12 mos or appointment in next 3 mos     Recent Outpatient Visits              1 month ago Chronic pansinusitis    [facility], Three Sandra Geiger Rodney T, MD    Office Visit    5 months ago Type 2 diabetes mellitus with microalbuminuria, without long-term current use of insulin ([facility])    [facility] [address] [place], [place] [address]Sandra Michael, MD    Office Visit    9 months ago Wellness examination    [facility] [address] [place], [place] [address]Sandra Michael, MD    Office Visit    1 year ago Type 2 diabetes mellitus with microalbuminuria, without long-term current use of insulin ([facility])    [facility] [address] [place], [place] [address]Sandra Michael, MD    Office Visit    1 year ago Type 2 diabetes mellitus with hyperglycemia, without long-term current use of insulin ([facility])    [address], MD    Telemedicine          Future Appointments         Provider Department Appt Notes    In 2 weeks Michael Ashton MD [address] virtual 3 month f/u                    Passed - EGFRCR or GFRNAA > 50     GFR Evaluation  EGFRCR: 98 , resulted on 3/1/2024             Future Appointments         Provider Department Appt Notes    In 2 weeks Michael Ashton MD [address] virtual 3 month f/u          Recent Outpatient Visits              1 month ago [facility] pansinus[facility]    [facility] [place] [facility] Rodney [facility]    Office Visit    5 months ago Type 2 diabetes mellitus with microalbuminuria, without long-term current use of insulin ([facility])    [address], MD    Office Visit    9 months ago Wellness  examination    [facility], [address] [place], [place] [address]Sandra Michael, MD    Office Visit    1 year ago Type 2 diabetes mellitus with microalbuminuria, without long-term current use of insulin ([facility])    [address], MD    Office Visit    1 year ago Type 2 diabetes mellitus with hyperglycemia, without long-term current use of insulin ([facility])    [address], MD    Telemedicine

## 2024-08-30 NOTE — TELEPHONE ENCOUNTER
Refill passed per [facility] protocol.  Requested Prescriptions   Pending Prescriptions Disp Refills    pioglitazone 30 MG Oral Tab 90 tablet 1     Sig: Take 1 tablet (30 mg total) by mouth daily.       Diabetes Medication Protocol Failed - 8/28/2024  9:53 AM        Failed - Last A1C < 7.5 and within past 6 months     Lab Results   Component Value Date    A1C 7.8 (H) 03/01/2024             Passed - In person appointment or virtual visit in the past 6 mos or appointment in next 3 mos     Recent Outpatient Visits              1 month ago Chronic pansinusitis    [facility], Three [facility], Himanshu Montalvo MD    Office Visit    5 months ago Type 2 diabetes mellitus with microalbuminuria, without long-term current use of insulin ([facility])    [address] Michael Anaya MD    Office Visit    9 months ago Wellness examination    [address], MD    Office Visit    1 year ago Type 2 diabetes mellitus with microalbuminuria, without long-term current use of insulin ([facility])    [address] Michael Anaya MD    Office Visit    1 year ago Type 2 diabetes mellitus with hyperglycemia, without long-term current use of insulin ([facility])    [address] Michael Anaya MD    Telemedicine          Future Appointments         Provider Department Appt Notes    In 2 weeks Michael Ashton MD [address] virtual 3 month f/u                    Passed - Microalbumin procedure in past 12 months or taking ACE/ARB        Passed - EGFRCR or GFRNAA > 50     GFR Evaluation  EGFRCR: 98 , resulted on 3/1/2024          Passed - GFR in the past 12 months          rosuvastatin 10 MG Oral Tab 90 tablet 0     Sig: Take 1 tablet (10 mg total) by mouth nightly.       Cholesterol Medication Protocol  Passed - 8/28/2024  9:53 AM        Passed - ALT < 80     Lab Results   Component Value Date    ALT 26 03/01/2024             Passed - ALT resulted within past year        Passed - Lipid panel within past 12 months     Lab Results   Component Value Date    CHOLEST 105 03/01/2024    TRIG 121 03/01/2024    HDL 38 (L) 03/01/2024    LDL 45 03/01/2024    VLDL 17 03/01/2024    NONHDLC 67 03/01/2024             Passed - In person appointment or virtual visit in the past 12 mos or appointment in next 3 mos     Recent Outpatient Visits              1 month ago Chronic pansinusitis    [facility], Three [facility], Himanshu Montalvo MD    Office Visit    5 months ago Type 2 diabetes mellitus with microalbuminuria, without long-term current use of insulin ([facility])    [address], MD    Office Visit    9 months ago Wellness examination    [address], MD    Office Visit    1 year ago Type 2 diabetes mellitus with microalbuminuria, without long-term current use of insulin ([facility])    [address] Michael Anaya MD    Office Visit    1 year ago Type 2 diabetes mellitus with hyperglycemia, without long-term current use of insulin ([facility])    [address], MD    Telemedicine          Future Appointments         Provider Department Appt Notes    In 2 weeks Michael Ashton MD [address] virtual 3 month f/u                     Refused Prescriptions Disp Refills    Fosinopril Sodium 20 MG Oral Tab 90 tablet 3     Sig: Take 1 tablet (20 mg total) by mouth daily.       Hypertension Medications Protocol Passed - 8/28/2024  9:53 AM        Passed - CMP or BMP in past 12 months        Passed - Last BP reading less than 140/90     BP Readings from Last 1 Encounters:    03/04/24 124/68               Passed - In person appointment or virtual visit in the past 12 mos or appointment in next 3 mos     Recent Outpatient Visits              1 month ago Chronic pansinusitis    [facility], [place] Plunkett Rodney T, MD    Office Visit    5 months ago Type 2 diabetes mellitus with microalbuminuria, without long-term current use of insulin ([facility])    [address], MD    Office Visit    9 months ago Wellness examination    [address], MD    Office Visit    1 year ago Type 2 diabetes mellitus with microalbuminuria, without long-term current use of insulin ([facility])    [address], MD    Office Visit    1 year ago Type 2 diabetes mellitus with hyperglycemia, without long-term current use of insulin ([facility])    [address], MD    Telemedicine          Future Appointments         Provider Department Appt Notes    In 2 weeks Michael Ashton MD [address] virtual 3 month f/u                    Passed - EGFRCR or GFRNAA > 50     GFR Evaluation  EGFRCR: 98 , resulted on 3/1/2024             Future Appointments         Provider Department Appt Notes    In 2 weeks Michael Ashton MD [address] virtual 3 month f/u          Recent Outpatient Visits              1 month ago Chronic pansinusitis    [facility], Three [facility] [place], Himanshu Montalvo MD    Office Visit    5 months ago Type 2 diabetes mellitus with microalbuminuria, without long-term current use of insulin ([facility])    [address], MD    Office Visit    9 months ago Wellness examination    [facility]  , [facility] Sandra Sharma Michael, MD    Office Visit    1 year ago Type 2 diabetes mellitus with microalbuminuria, without long-term current use of insulin ([facility])    [facility] [place] [facility], [facility], [facility]    Office Visit    1 year ago Type 2 diabetes mellitus with hyperglycemia, without long-term current use of insulin ([facility])    [facility], Sandra Glass Michael, MD    Telemedicine

## 2024-09-02 NOTE — TELEPHONE ENCOUNTER
Please review.  Protocol failed / Has no protocol.     Requested Prescriptions   Pending Prescriptions Disp Refills    JANUMET  MG Oral Tab [Pharmacy Med Name: JANUMET 50/1000MG TABLETS] 180 tablet 3     Sig: TAKE 1 TABLET BY MOUTH TWICE DAILY WITH MEALS       Diabetes Medication Protocol Failed - 8/30/2024  7:50 AM        Failed - Last A1C < 7.5 and within past 6 months     Lab Results   Component Value Date    A1C 7.8 (H) 03/01/2024             Passed - In person appointment or virtual visit in the past 6 mos or appointment in next 3 mos     Recent Outpatient Visits              1 month ago Chronic pansinusitis    [facility], Three [facility], Himanshu Montalvo MD    Office Visit    6 months ago Type 2 diabetes mellitus with microalbuminuria, without long-term current use of insulin ([facility])    [address], MD    Office Visit    9 months ago Wellness examination    [address], MD    Office Visit    1 year ago Type 2 diabetes mellitus with microalbuminuria, without long-term current use of insulin ([facility])    [address] Michael Anaya MD    Office Visit    1 year ago Type 2 diabetes mellitus with hyperglycemia, without long-term current use of insulin ([facility])    [address] Michael Anaya MD    Telemedicine          Future Appointments         Provider Department Appt Notes    In 2 weeks Michael Ashton MD [address] virtual 3 month f/u                    Passed - Microalbumin procedure in past 12 months or taking ACE/ARB        Passed - EGFRCR or GFRNAA > 50     GFR Evaluation  EGFRCR: 98 , resulted on 3/1/2024          Passed - GFR in the past 12 months           Future Appointments         Provider Department Appt Notes    In 2 weeks  Michael Ashton MD [facility], [address] [place] [address] 3 month f/u          Recent Outpatient Visits              1 month ago Chronic pansinusitis    [facility], Three [facility], Himanshu Montalvo MD    Office Visit    6 months ago Type 2 diabetes mellitus with microalbuminuria, without long-term current use of insulin ([facility])    [facility], [address] [place] [address] Michael Anaya MD    Office Visit    9 months ago Wellness examination    [facility], [address] [place], [place] [address]Sandra Michael, MD    Office Visit    1 year ago Type 2 diabetes mellitus with microalbuminuria, without long-term current use of insulin ([facility])    [address], MD    Office Visit    1 year ago Type 2 diabetes mellitus with hyperglycemia, without long-term current use of insulin ([facility])    [facility], [address] [place], [place] [address]Sandra Michael, MD    Telemedicine

## 2024-09-03 ENCOUNTER — TELEPHONE (OUTPATIENT)
Dept: INTERNAL MEDICINE CLINIC | Facility: CLINIC | Age: 64
End: 2024-09-03

## 2024-09-03 RX ORDER — PEN NEEDLE, DIABETIC 30 GX3/16"
1 NEEDLE, DISPOSABLE MISCELLANEOUS NIGHTLY
Qty: 100 EACH | Refills: 3 | Status: SHIPPED | OUTPATIENT
Start: 2024-09-03

## 2024-09-03 RX ORDER — SITAGLIPTIN AND METFORMIN HYDROCHLORIDE 1000; 50 MG/1; MG/1
1 TABLET, FILM COATED ORAL 2 TIMES DAILY WITH MEALS
Qty: 180 TABLET | Refills: 3 | Status: SHIPPED | OUTPATIENT
Start: 2024-09-03

## 2024-09-03 NOTE — TELEPHONE ENCOUNTER
Tye Sylvester requesting Medication Refill for:    Medication name and dose (copy and paste from medication list): JANUMET 50/100MG TABLETS.   QTY:180    If medication is not on medication list - transfer patient to RN queue for triage    Preferred Pharmacy:   [id number] [address], [phone], [phone]   [address] [place] [address]   Phone: [phone] Fax: [phone]   Hours: Not open 24 hours       LOV: 3/4/2024   Last Refill date: Unknown  Next Scheduled appointment: 9/17/2024

## 2024-09-03 NOTE — TELEPHONE ENCOUNTER
Duplicate request, previously addressed.    Sent 9/3/24 year supply.      Outpatient Medication Detail     Disp Refills Start End    SITagliptin-metFORMIN HCl (JANUMET)  MG Oral Tab 180 tablet 3 9/3/2024 --    Sig - Route: Take 1 tablet by mouth 2 (two) times daily with meals. - Oral    Sent to pharmacy as: Janumet  MG Oral Tablet (SITagliptin-metFORMIN HCl)    E-Prescribing Status: Receipt confirmed by pharmacy (9/3/2024  8:53 AM CDT)      Pharmacy    [facility] #[id number] - [place], [place] - [facility] [address], [phone], [phone]

## 2024-09-03 NOTE — TELEPHONE ENCOUNTER
Refill passed per [facility] protocol.  Requested Prescriptions   Pending Prescriptions Disp Refills    Insulin Pen Needle (PEN NEEDLES) 32G X 4 MM Does not apply Misc 100 each 1     Si each at bedtime.       Diabetic Supplies Protocol Passed - 2024  1:01 PM        Passed - In person appointment or virtual visit in the past 12 mos or appointment in next 3 mos     Recent Outpatient Visits              1 month ago Chronic pansinusitis    [facility], Three [facility] Sandra Plunkett Rodney T, MD    Office Visit    6 months ago Type 2 diabetes mellitus with microalbuminuria, without long-term current use of insulin ([facility])    [address] Michael Anaya MD    Office Visit    9 months ago Wellness examination    [address], MD    Office Visit    1 year ago Type 2 diabetes mellitus with microalbuminuria, without long-term current use of insulin ([facility])    [address], MD    Office Visit    1 year ago Type 2 diabetes mellitus with hyperglycemia, without long-term current use of insulin ([facility])    [address] Michael Anaya MD    Telemedicine          Future Appointments         Provider Department Appt Notes    In 2 weeks iMchael Ashton MD [address] virtual 3 month f/u                       Recent Outpatient Visits              1 month ago Chronic pansinusitis    [facility], [place] Plunkett Rodney T, MD    Office Visit    6 months ago Type 2 diabetes mellitus with microalbuminuria, without long-term current use of insulin ([facility])    [address], MD    Office Visit    9 months ago Wellness examination    [address],  Michael Anaya MD    Office Visit    1 year ago Type 2 diabetes mellitus with microalbuminuria, without long-term current use of insulin ([facility])    [address] Michael Anaya MD    Office Visit    1 year ago Type 2 diabetes mellitus with hyperglycemia, without long-term current use of insulin ([facility])    [address] MD    Telemedicine          Future Appointments         Provider Department Appt Notes    In 2 weeks Michael Ashton MD [address] virtual 3 month f/u

## 2024-09-16 ENCOUNTER — TELEPHONE (OUTPATIENT)
Dept: INTERNAL MEDICINE CLINIC | Facility: CLINIC | Age: 64
End: 2024-09-16

## 2024-09-16 NOTE — TELEPHONE ENCOUNTER
Future Appointments   Date Time Provider Department Center   12/10/2024  3:00 PM Michael Ashton MD [facility] [address] [facility]

## 2024-10-04 ENCOUNTER — HOSPITAL ENCOUNTER (OUTPATIENT)
Age: 64
Discharge: HOME | End: 2024-10-04
Payer: COMMERCIAL

## 2024-10-04 VITALS
HEART RATE: 86 BPM | TEMPERATURE: 98 F | SYSTOLIC BLOOD PRESSURE: 152 MMHG | OXYGEN SATURATION: 95 % | RESPIRATION RATE: 20 BRPM | DIASTOLIC BLOOD PRESSURE: 80 MMHG

## 2024-10-04 DIAGNOSIS — H60.502 ACUTE OTITIS EXTERNA OF LEFT EAR, UNSPECIFIED TYPE: Primary | ICD-10-CM

## 2024-10-04 PROCEDURE — 99213 OFFICE O/P EST LOW 20 MIN: CPT | Performed by: NURSE PRACTITIONER

## 2024-10-04 RX ORDER — CIPROFLOXACIN AND DEXAMETHASONE 3; 1 MG/ML; MG/ML
4 SUSPENSION/ DROPS AURICULAR (OTIC) 2 TIMES DAILY
Qty: 1 EACH | Refills: 0 | Status: SHIPPED | OUTPATIENT
Start: 2024-10-04 | End: 2024-10-11

## 2024-10-04 RX ORDER — DOXYCYCLINE 100 MG/1
100 CAPSULE ORAL 2 TIMES DAILY
Qty: 14 CAPSULE | Refills: 0 | Status: SHIPPED | OUTPATIENT
Start: 2024-10-04 | End: 2024-10-11

## 2024-10-04 NOTE — ED PROVIDER NOTES
Patient Seen in: [facility]      History     Chief Complaint   Patient presents with    Ear Problem Pain     Stated Complaint: ear pain    Subjective:   HPI  64-year-old with allergic rhinitis, arthritis, diabetes, hypertension, hyperlipidemia presents complaining of increase in his allergies with nasal congestion and sinus pressure and left ear pain.  He states he has some postnasal drip causing a cough.  He denies any fever or chills.  He reports some ear swelling intermittently for 2 weeks.    Objective:     Past Medical History:    Allergic rhinitis    Arthritis    Diabetes (HCC)    Diabetes mellitus (HCC)    Essential hypertension    High cholesterol    History of colon polyps    Hyperlipidemia    Hypertension    Irregular bowel habits    Obesity    MADY (obstructive sleep apnea)    on CPAP 9cm    Wears glasses    Weight gain    After taking Type 2 Diabetes Medicines              Past Surgical History:   Procedure Laterality Date    Colonoscopy  2008    Colonoscopy  01/11/2017    adenomatous polyp, diverticuli and int hem    Other surgical history      debviated septum, nasal surgery    Other surgical history      arthroscopic knee surgery right                Social History     Socioeconomic History    Marital status:     Number of children: 2   Occupational History    Occupation: [employer]    Tobacco Use    Smoking status: Never    Smokeless tobacco: Never   Vaping Use    Vaping status: Never Used   Substance and Sexual Activity    Alcohol use: Yes     Alcohol/week: 1.0 standard drink of alcohol     Types: 1 Glasses of wine per week     Comment: CAGE 8/3/20    Drug use: Never    Sexual activity: Not Currently              Review of Systems   All other systems reviewed and are negative.      Positive for stated complaint: ear pain  Other systems are as noted in HPI.  Constitutional and vital signs reviewed.      All other systems reviewed and negative except as noted  above.    Physical Exam     ED Triage Vitals [10/04/24 1627]   /80   Pulse 86   Resp 20   Temp 98 °F (36.7 °C)   Temp src Temporal   SpO2 95 %   O2 Device None (Room air)       Current Vitals:   Vital Signs  BP: 152/80  Pulse: 86  Resp: 20  Temp: 98 °F (36.7 °C)  Temp src: Temporal    Oxygen Therapy  SpO2: 95 %  O2 Device: None (Room air)        Physical Exam  Vitals and nursing note reviewed.   Constitutional:       General: He is not in acute distress.     Appearance: He is well-developed. He is not ill-appearing or toxic-appearing.   HENT:      Right Ear: Tympanic membrane, ear canal and external ear normal.      Ears:      Comments: Left TM with mild redness.  Mild swelling with some yellow drainage.  Mild to tragus tenderness.  No tenderness to the pinna.  TM with fluid.     Nose: Congestion present. No rhinorrhea.      Mouth/Throat:      Pharynx: No oropharyngeal exudate or posterior oropharyngeal erythema.   Cardiovascular:      Rate and Rhythm: Normal rate and regular rhythm.      Heart sounds: Normal heart sounds.   Pulmonary:      Effort: Pulmonary effort is normal. No respiratory distress.      Breath sounds: Normal breath sounds. No wheezing.   Skin:     General: Skin is warm and dry.   Neurological:      Mental Status: He is alert and oriented to person, place, and time.             ED Course   Labs Reviewed - No data to display            MDM     Medical Decision Making  64-year-old with allergic rhinitis, arthritis, diabetes, hypertension, hyperlipidemia presents complaining of increase in his allergies with nasal congestion and sinus pressure and left ear pain.  He states he has some postnasal drip causing a cough.  He denies any fever or chills.  He reports some ear swelling intermittently for 2 weeks.    Patient coming in with ear pain, postnasal drip.   Differential diagnosis includes but not limited to ear pain, postnasal drip, allergic rhinitis, sinusitis, otitis externa, otitis  media  Will treat for otitis externa.  Will discharge on Ciprodex and doxycycline with Afrin prior to flying and follow-up with ENT as needed. Patient/Parent is comfortable with this plan.    Overall Pt looks good. Non-toxic, well-hydrated and in no respiratory distress. Vital signs are reassuring. Exam is reassuring. I do not believe pt requires and additional diagnostic studies or intervention. I believe pt can be discharged home to continue evaluation as an outpatient. Follow-up provider given. Discharge instructions given and reviewed. Return for any problems. All understand and agree with the plan.        Problems Addressed:  Acute otitis externa of left ear, unspecified type: acute illness or injury        Disposition and Plan     Clinical Impression:  1. Acute otitis externa of left ear, unspecified type         Disposition:  Discharge  10/4/2024  4:43 pm    Follow-up:  Himanshu Miramontes MD  [address] [facility] [address]  [phone]    Call   As needed          Medications Prescribed:  Discharge Medication List as of 10/4/2024  4:47 PM        START taking these medications    Details   ciprofloxacin-dexamethasone (CIPRODEX) 0.3-0.1 % Otic Suspension Place 4 drops into the left ear 2 (two) times daily for 7 days., Normal, Disp-1 each, R-0      doxycycline 100 MG Oral Cap Take 1 capsule (100 mg total) by mouth 2 (two) times daily for 7 days., Normal, Disp-14 capsule, R-0                 Supplementary Documentation:

## 2024-10-04 NOTE — DISCHARGE INSTRUCTIONS
Use drops and take antibiotic as directed.  Follow up with ent as needed.  Use Afrin prior to flying x3 days only.

## 2024-10-28 ENCOUNTER — LAB ENCOUNTER (OUTPATIENT)
Dept: LAB | Age: 64
End: 2024-10-28
Attending: FAMILY MEDICINE
Payer: COMMERCIAL

## 2024-10-28 DIAGNOSIS — E11.29 TYPE 2 DIABETES MELLITUS WITH MICROALBUMINURIA, WITHOUT LONG-TERM CURRENT USE OF INSULIN (HCC): ICD-10-CM

## 2024-10-28 DIAGNOSIS — R80.9 TYPE 2 DIABETES MELLITUS WITH MICROALBUMINURIA, WITHOUT LONG-TERM CURRENT USE OF INSULIN (HCC): ICD-10-CM

## 2024-10-28 LAB
ALBUMIN SERPL-MCNC: 4.6 G/DL (ref 3.2–4.8)
ALBUMIN/GLOB SERPL: 1.8 {RATIO} (ref 1–2)
ALP LIVER SERPL-CCNC: 72 U/L
ALT SERPL-CCNC: 24 U/L
ANION GAP SERPL CALC-SCNC: 4 MMOL/L (ref 0–18)
AST SERPL-CCNC: 18 U/L (ref ?–34)
BILIRUB SERPL-MCNC: 0.8 MG/DL (ref 0.2–1.1)
BUN BLD-MCNC: 15 MG/DL (ref 9–23)
CALCIUM BLD-MCNC: 9.9 MG/DL (ref 8.7–10.4)
CHLORIDE SERPL-SCNC: 104 MMOL/L (ref 98–112)
CO2 SERPL-SCNC: 29 MMOL/L (ref 21–32)
CREAT BLD-MCNC: 0.79 MG/DL
EGFRCR SERPLBLD CKD-EPI 2021: 99 ML/MIN/1.73M2 (ref 60–?)
EST. AVERAGE GLUCOSE BLD GHB EST-MCNC: 226 MG/DL (ref 68–126)
FASTING STATUS PATIENT QL REPORTED: YES
GLOBULIN PLAS-MCNC: 2.5 G/DL (ref 2–3.5)
GLUCOSE BLD-MCNC: 223 MG/DL (ref 70–99)
HBA1C MFR BLD: 9.5 % (ref ?–5.7)
OSMOLALITY SERPL CALC.SUM OF ELEC: 292 MOSM/KG (ref 275–295)
POTASSIUM SERPL-SCNC: 4.7 MMOL/L (ref 3.5–5.1)
PROT SERPL-MCNC: 7.1 G/DL (ref 5.7–8.2)
SODIUM SERPL-SCNC: 137 MMOL/L (ref 136–145)

## 2024-10-28 PROCEDURE — 80053 COMPREHEN METABOLIC PANEL: CPT | Performed by: FAMILY MEDICINE

## 2024-10-28 PROCEDURE — 83036 HEMOGLOBIN GLYCOSYLATED A1C: CPT | Performed by: FAMILY MEDICINE

## 2024-10-29 ENCOUNTER — TELEMEDICINE (OUTPATIENT)
Dept: INTERNAL MEDICINE CLINIC | Facility: CLINIC | Age: 64
End: 2024-10-29
Payer: COMMERCIAL

## 2024-10-29 DIAGNOSIS — R80.9 TYPE 2 DIABETES MELLITUS WITH MICROALBUMINURIA, WITHOUT LONG-TERM CURRENT USE OF INSULIN (HCC): Primary | ICD-10-CM

## 2024-10-29 DIAGNOSIS — S16.1XXA CERVICAL MYOFASCIAL STRAIN, INITIAL ENCOUNTER: ICD-10-CM

## 2024-10-29 DIAGNOSIS — E11.29 TYPE 2 DIABETES MELLITUS WITH MICROALBUMINURIA, WITHOUT LONG-TERM CURRENT USE OF INSULIN (HCC): Primary | ICD-10-CM

## 2024-10-29 PROCEDURE — 99214 OFFICE O/P EST MOD 30 MIN: CPT | Performed by: FAMILY MEDICINE

## 2024-10-29 RX ORDER — CYCLOBENZAPRINE HCL 5 MG
5 TABLET ORAL 3 TIMES DAILY PRN
Qty: 30 TABLET | Refills: 0 | Status: SHIPPED | OUTPATIENT
Start: 2024-10-29

## 2024-10-29 NOTE — PROGRESS NOTES
The patient's office visit was converted to a video visit with informed patient consent.   Time Spent: 12 min    Subjective     HPI:   Tye Sylvester is a 64 year old male who presents for follow-up. His diet has suffered over the last few months and his glucose. He stopped his Ozempic as he believed this was causing GI upset. He is confident that he can improve his diet and wants to restart his diet.     He has suffered from right sided neck pain for the last few months. He feels a lump on the right side of the neck that is mildly tender.     REVIEW OF SYSTEMS:  GENERAL: feels well otherwise.     Physical Exam:  Appears well on exam.     Assessment and Plan:  Tye Sylvester is presenting for follow-up    Type 2 diabetes mellitus with microalbuminuria, without long-term current use of insulin (HCC)  Glycemic control has worsened due to poor diet and stopping Ozempic. He will restart his Ozempic and work on dietary improvements. Will follow-up in Dec for CPE and review CGM data at that time and plan repeat labs in 3 months.     Cervical myofascial strain, initial encounter  Will begin PT and discussed use of PRN muscle relaxant. Recommend in person evaluation, will assist in scheduling.   - OP REFERRAL TO [facility]  - cyclobenzaprine 5 MG Oral Tab; Take 1 tablet (5 mg total) by mouth 3 (three) times daily as needed for Muscle spasms.  Dispense: 30 tablet; Refill: 0      Michael Ashton MD    Tye Sylvester understands phone evaluation is not a substitute for face-to-face examination or emergency care. Patient advised to go to ER or call 911 for worsening symptoms or acute distress.     Please note that the following visit was completed using two-way, real-time interactive video communication. There are limitations of this visit as no physical exam could be performed.  Every conscious effort was taken to allow for sufficient and adequate time.  This billing visit was spent on  reviewing labs, medications, radiology tests and decision making.  Appropriate medical decision-making and tests are ordered as detailed in the plan of care above.

## 2024-11-11 ENCOUNTER — OFFICE VISIT (OUTPATIENT)
Dept: INTERNAL MEDICINE CLINIC | Facility: CLINIC | Age: 64
End: 2024-11-11
Payer: COMMERCIAL

## 2024-11-11 ENCOUNTER — LAB ENCOUNTER (OUTPATIENT)
Dept: LAB | Age: 64
End: 2024-11-11
Attending: FAMILY MEDICINE
Payer: COMMERCIAL

## 2024-11-11 VITALS
OXYGEN SATURATION: 97 % | RESPIRATION RATE: 18 BRPM | HEART RATE: 79 BPM | HEIGHT: 73 IN | WEIGHT: 315 LBS | SYSTOLIC BLOOD PRESSURE: 138 MMHG | BODY MASS INDEX: 41.75 KG/M2 | DIASTOLIC BLOOD PRESSURE: 72 MMHG | TEMPERATURE: 98 F

## 2024-11-11 DIAGNOSIS — Z12.5 SCREENING FOR PROSTATE CANCER: ICD-10-CM

## 2024-11-11 DIAGNOSIS — R22.1 NECK MASS: Primary | ICD-10-CM

## 2024-11-11 DIAGNOSIS — R22.1 NECK MASS: ICD-10-CM

## 2024-11-11 DIAGNOSIS — I10 ESSENTIAL HYPERTENSION: ICD-10-CM

## 2024-11-11 LAB
BASOPHILS # BLD AUTO: 0.09 X10(3) UL (ref 0–0.2)
BASOPHILS NFR BLD AUTO: 1.3 %
COMPLEXED PSA SERPL-MCNC: 0.25 NG/ML (ref ?–4)
EOSINOPHIL # BLD AUTO: 0.24 X10(3) UL (ref 0–0.7)
EOSINOPHIL NFR BLD AUTO: 3.4 %
ERYTHROCYTE [DISTWIDTH] IN BLOOD BY AUTOMATED COUNT: 13.5 %
HCT VFR BLD AUTO: 42.8 %
HGB BLD-MCNC: 14.1 G/DL
IMM GRANULOCYTES # BLD AUTO: 0.05 X10(3) UL (ref 0–1)
IMM GRANULOCYTES NFR BLD: 0.7 %
LYMPHOCYTES # BLD AUTO: 1.59 X10(3) UL (ref 1–4)
LYMPHOCYTES NFR BLD AUTO: 22.4 %
MCH RBC QN AUTO: 29.3 PG (ref 26–34)
MCHC RBC AUTO-ENTMCNC: 32.9 G/DL (ref 31–37)
MCV RBC AUTO: 88.8 FL
MONOCYTES # BLD AUTO: 0.62 X10(3) UL (ref 0.1–1)
MONOCYTES NFR BLD AUTO: 8.7 %
NEUTROPHILS # BLD AUTO: 4.5 X10 (3) UL (ref 1.5–7.7)
NEUTROPHILS # BLD AUTO: 4.5 X10(3) UL (ref 1.5–7.7)
NEUTROPHILS NFR BLD AUTO: 63.5 %
PLATELET # BLD AUTO: 268 10(3)UL (ref 150–450)
RBC # BLD AUTO: 4.82 X10(6)UL
WBC # BLD AUTO: 7.1 X10(3) UL (ref 4–11)

## 2024-11-11 PROCEDURE — 99214 OFFICE O/P EST MOD 30 MIN: CPT | Performed by: FAMILY MEDICINE

## 2024-11-11 PROCEDURE — 3008F BODY MASS INDEX DOCD: CPT | Performed by: FAMILY MEDICINE

## 2024-11-11 PROCEDURE — 84153 ASSAY OF PSA TOTAL: CPT | Performed by: FAMILY MEDICINE

## 2024-11-11 PROCEDURE — 3046F HEMOGLOBIN A1C LEVEL >9.0%: CPT | Performed by: FAMILY MEDICINE

## 2024-11-11 PROCEDURE — 90656 IIV3 VACC NO PRSV 0.5 ML IM: CPT | Performed by: FAMILY MEDICINE

## 2024-11-11 PROCEDURE — 85025 COMPLETE CBC W/AUTO DIFF WBC: CPT | Performed by: FAMILY MEDICINE

## 2024-11-11 PROCEDURE — 3078F DIAST BP <80 MM HG: CPT | Performed by: FAMILY MEDICINE

## 2024-11-11 PROCEDURE — 3075F SYST BP GE 130 - 139MM HG: CPT | Performed by: FAMILY MEDICINE

## 2024-11-11 PROCEDURE — 90471 IMMUNIZATION ADMIN: CPT | Performed by: FAMILY MEDICINE

## 2024-11-11 NOTE — PROGRESS NOTES
Tye Sylvester  9/9/1960    Chief Complaint   Patient presents with    Follow - Up     EJ Rm 9- Pt is here to f/u on his neck pain, pt state on the rt side of his neck it a bump that make his neck stiff and the pain travel up       HPI:   Tye Sylvester is a 64 year old male who presents for follow-up regarding his posterior cervical discomfort and swelling. His symptoms have been persistent, recently returned from vacation. Feels a discrete mass on the lower right posterior neck. No f/c, no recent illness.    Current Outpatient Medications   Medication Sig Dispense Refill    cyclobenzaprine 5 MG Oral Tab Take 1 tablet (5 mg total) by mouth 3 (three) times daily as needed for Muscle spasms. 30 tablet 0    SITagliptin-metFORMIN HCl (JANUMET)  MG Oral Tab Take 1 tablet by mouth 2 (two) times daily with meals. 180 tablet 3    Insulin Pen Needle (PEN NEEDLES) 32G X 4 MM Does not apply Misc Inject 1 Needle into the skin at bedtime. 100 each 3    pioglitazone 30 MG Oral Tab Take 1 tablet (30 mg total) by mouth daily. 90 tablet 3    rosuvastatin 10 MG Oral Tab Take 1 tablet (10 mg total) by mouth nightly. 90 tablet 3    predniSONE 5 MG Oral Tab Take 1 tablet (5 mg total) by mouth daily. 10 tablet 0    clarithromycin 500 MG Oral Tab Take 1 tablet (500 mg total) by mouth 2 (two) times daily. 20 tablet 0    Insulin Glargine-yfgn 100 UNIT/ML Subcutaneous Solution Pen-injector Inject 20 Units into the skin nightly. 21 mL 3    Fosinopril Sodium 20 MG Oral Tab Take 1 tablet (20 mg total) by mouth daily. 90 tablet 3    pantoprazole 20 MG Oral Tab EC Take 1 tablet (20 mg total) by mouth before breakfast. 90 tablet 0    Continuous Blood Gluc Sensor (FREESTYLE CELESTINE 3 SENSOR) Does not apply Misc 1 each every 14 (fourteen) days. Apply sensor every 14 days 6 each 3    PREVIDENT 5000 DRY MOUTH 1.1 % Dental Gel       Fluticasone Propionate 50 MCG/ACT Nasal Suspension Two sprays in each nostril once daily 1 Bottle 6     semaglutide 4 MG/3ML Subcutaneous Solution Pen-injector Inject 1 mg into the skin once a week. 9 mL 0      Allergies[1]   Past Medical History:    Allergic rhinitis    Arthritis    Diabetes (HCC)    Diabetes mellitus (HCC)    Essential hypertension    High cholesterol    History of colon polyps    Hyperlipidemia    Hypertension    Irregular bowel habits    Obesity    MADY (obstructive sleep apnea)    on CPAP 9cm    Wears glasses    Weight gain    After taking Type 2 Diabetes Medicines      Patient Active Problem List   Diagnosis    History of colon polyps    Allergic rhinitis    Sleep apnea, obstructive    Obesity, unspecified    Essential hypertension, benign    Mixed hyperlipidemia    Type 2 diabetes mellitus with microalbuminuria, without long-term current use of insulin (HCC)    Sinusitis, chronic    History of adenomatous polyp of colon    Abscess of anal and rectal regions    Anal fistula    Asthma without status asthmaticus ([facility])    Dysphagia, unspecified    Gastric ulcer, acute with hemorrhage      Past Surgical History:   Procedure Laterality Date    Colonoscopy  2008    Colonoscopy  01/11/2017    adenomatous polyp, diverticuli and int hem    Other surgical history      debviated septum, nasal surgery    Other surgical history      arthroscopic knee surgery right      Family History   Problem Relation Age of Onset    Prostate Cancer Father     Diabetes Mother     Hypertension Mother       Social History     Socioeconomic History    Marital status:     Number of children: 2   Occupational History    Occupation: [employer]    Tobacco Use    Smoking status: Never    Smokeless tobacco: Never   Vaping Use    Vaping status: Never Used   Substance and Sexual Activity    Alcohol use: Yes     Alcohol/week: 1.0 standard drink of alcohol     Types: 1 Glasses of wine per week     Comment: CAGE 8/3/20    Drug use: Never    Sexual activity: Not Currently         REVIEW OF SYSTEMS:   GENERAL: feels  well otherwise, see HPI    EXAM:   /72   Pulse 79   Temp 97.8 °F (36.6 °C) (Temporal)   Resp 18   Ht 6' 1\" (1.854 m)   Wt (!) 335 lb 9.6 oz (152.2 kg)   SpO2 97%   BMI 44.28 kg/m²   GENERAL: Well developed, well nourished,in no apparent distress  NECK: discrete subcutaneous soft tissues mass of the posterior lower right neck.     ASSESSMENT AND PLAN:   Tye Sylvester is a 64 year old male who presents for follow-up    Neck mass  Will further evaluate with CT neck and CBC today. No constitutional symptoms or recent illness. Further recommendations pending results. Has follow-up scheduled in 1 month.   - CT SOFT TISSUE OF NECK (CPT=70490); Future  - CBC W Differential W Platelet [E]; Future    Screening for prostate cancer  - PSA, Total (Screening) [E]; Future    All questions were answered and the patient agrees with the plan.     Thank you,  Michael Ashton MD         [1]   Allergies  Allergen Reactions    Penicillins HIVES    Cephalosporins UNKNOWN

## 2024-11-13 ENCOUNTER — TELEPHONE (OUTPATIENT)
Dept: INTERNAL MEDICINE CLINIC | Facility: CLINIC | Age: 64
End: 2024-11-13

## 2024-11-13 ENCOUNTER — HOSPITAL ENCOUNTER (OUTPATIENT)
Dept: CT IMAGING | Facility: HOSPITAL | Age: 64
Discharge: HOME | End: 2024-11-13
Attending: FAMILY MEDICINE
Payer: COMMERCIAL

## 2024-11-13 DIAGNOSIS — R22.1 NECK MASS: ICD-10-CM

## 2024-11-13 PROCEDURE — 70491 CT SOFT TISSUE NECK W/DYE: CPT | Performed by: FAMILY MEDICINE

## 2024-11-13 NOTE — TELEPHONE ENCOUNTER
Maryana notified per Dr Ashton that the CT Soft Tissue Neck w/o contrast can be changed to with contrast.  Maryana verbalizes understanding and will change the order.

## 2024-11-13 NOTE — TELEPHONE ENCOUNTER
Maryana CT tech called stating the order for CT neck has been ordered without contrast, states this is usually done with contrast, patient is at the appointment to have CT completed    LOV 11/11    Please advise on order    Call back [phone]

## 2024-11-14 DIAGNOSIS — D17.0 LIPOMA OF NECK: Primary | ICD-10-CM

## 2024-11-15 RX ORDER — FOSINOPRIL SODIUM 20 MG/1
20 TABLET ORAL DAILY
Qty: 90 TABLET | Refills: 3 | OUTPATIENT
Start: 2024-11-15

## 2024-12-07 DIAGNOSIS — E11.29 TYPE 2 DIABETES MELLITUS WITH MICROALBUMINURIA, WITHOUT LONG-TERM CURRENT USE OF INSULIN (HCC): ICD-10-CM

## 2024-12-07 DIAGNOSIS — R80.9 TYPE 2 DIABETES MELLITUS WITH MICROALBUMINURIA, WITHOUT LONG-TERM CURRENT USE OF INSULIN (HCC): ICD-10-CM

## 2024-12-07 DIAGNOSIS — E11.65 TYPE 2 DIABETES MELLITUS WITH HYPERGLYCEMIA, WITHOUT LONG-TERM CURRENT USE OF INSULIN (HCC): ICD-10-CM

## 2024-12-09 DIAGNOSIS — R80.9 TYPE 2 DIABETES MELLITUS WITH MICROALBUMINURIA, WITHOUT LONG-TERM CURRENT USE OF INSULIN (HCC): ICD-10-CM

## 2024-12-09 DIAGNOSIS — E11.65 TYPE 2 DIABETES MELLITUS WITH HYPERGLYCEMIA, WITHOUT LONG-TERM CURRENT USE OF INSULIN (HCC): ICD-10-CM

## 2024-12-09 DIAGNOSIS — E11.29 TYPE 2 DIABETES MELLITUS WITH MICROALBUMINURIA, WITHOUT LONG-TERM CURRENT USE OF INSULIN (HCC): ICD-10-CM

## 2024-12-09 RX ORDER — ACYCLOVIR 800 MG/1
1 TABLET ORAL
Qty: 6 EACH | Refills: 3 | Status: CANCELLED | OUTPATIENT
Start: 2024-12-09

## 2024-12-10 ENCOUNTER — OFFICE VISIT (OUTPATIENT)
Dept: INTERNAL MEDICINE CLINIC | Facility: CLINIC | Age: 64
End: 2024-12-10
Payer: COMMERCIAL

## 2024-12-10 VITALS
OXYGEN SATURATION: 97 % | SYSTOLIC BLOOD PRESSURE: 132 MMHG | DIASTOLIC BLOOD PRESSURE: 80 MMHG | TEMPERATURE: 97 F | WEIGHT: 315 LBS | BODY MASS INDEX: 43 KG/M2 | HEART RATE: 92 BPM

## 2024-12-10 DIAGNOSIS — J45.20 MILD INTERMITTENT ASTHMA WITHOUT STATUS ASTHMATICUS WITHOUT COMPLICATION (HCC): ICD-10-CM

## 2024-12-10 DIAGNOSIS — I10 ESSENTIAL HYPERTENSION, BENIGN: ICD-10-CM

## 2024-12-10 DIAGNOSIS — D17.0 LIPOMA OF NECK: ICD-10-CM

## 2024-12-10 DIAGNOSIS — R80.9 TYPE 2 DIABETES MELLITUS WITH MICROALBUMINURIA, WITHOUT LONG-TERM CURRENT USE OF INSULIN (HCC): ICD-10-CM

## 2024-12-10 DIAGNOSIS — Z86.0100 HISTORY OF COLON POLYPS: ICD-10-CM

## 2024-12-10 DIAGNOSIS — Z00.00 WELLNESS EXAMINATION: Primary | ICD-10-CM

## 2024-12-10 DIAGNOSIS — E78.2 MIXED HYPERLIPIDEMIA: ICD-10-CM

## 2024-12-10 DIAGNOSIS — E11.29 TYPE 2 DIABETES MELLITUS WITH MICROALBUMINURIA, WITHOUT LONG-TERM CURRENT USE OF INSULIN (HCC): ICD-10-CM

## 2024-12-10 DIAGNOSIS — G47.33 SLEEP APNEA, OBSTRUCTIVE: ICD-10-CM

## 2024-12-10 RX ORDER — ACYCLOVIR 800 MG/1
1 TABLET ORAL
Qty: 6 EACH | Refills: 3 | Status: SHIPPED | OUTPATIENT
Start: 2024-12-10

## 2024-12-10 NOTE — PROGRESS NOTES
Tye Sylvester  9/9/1960    Chief Complaint   Patient presents with    Physical     Lab results  Reviewed Preventative/Wellness form with patient.         HPI:   Tye Sylvester is a 64 year old male who presents for CPE. He has upcoming evaluation with gen surg for his neck lipoma. His blood sugars are starting to improve. Has been consistent with his medications, but struggles with his Trent as the sensor rarely lasts the full 14 days. He believes he saw his optometrist, [facility] in [place].     Current Outpatient Medications   Medication Sig Dispense Refill    cyclobenzaprine 5 MG Oral Tab Take 1 tablet (5 mg total) by mouth 3 (three) times daily as needed for Muscle spasms. 30 tablet 0    SITagliptin-metFORMIN HCl (JANUMET)  MG Oral Tab Take 1 tablet by mouth 2 (two) times daily with meals. 180 tablet 3    Insulin Pen Needle (PEN NEEDLES) 32G X 4 MM Does not apply Misc Inject 1 Needle into the skin at bedtime. 100 each 3    pioglitazone 30 MG Oral Tab Take 1 tablet (30 mg total) by mouth daily. 90 tablet 3    rosuvastatin 10 MG Oral Tab Take 1 tablet (10 mg total) by mouth nightly. 90 tablet 3    predniSONE 5 MG Oral Tab Take 1 tablet (5 mg total) by mouth daily. 10 tablet 0    clarithromycin 500 MG Oral Tab Take 1 tablet (500 mg total) by mouth 2 (two) times daily. 20 tablet 0    Insulin Glargine-yfgn 100 UNIT/ML Subcutaneous Solution Pen-injector Inject 20 Units into the skin nightly. 21 mL 3    Fosinopril Sodium 20 MG Oral Tab Take 1 tablet (20 mg total) by mouth daily. 90 tablet 3    pantoprazole 20 MG Oral Tab EC Take 1 tablet (20 mg total) by mouth before breakfast. 90 tablet 0    Continuous Blood Gluc Sensor (FREESTYLE TRENT 3 SENSOR) Does not apply Misc 1 each every 14 (fourteen) days. Apply sensor every 14 days 6 each 3    PREVIDENT 5000 DRY MOUTH 1.1 % Dental Gel       Fluticasone Propionate 50 MCG/ACT Nasal Suspension Two sprays in each nostril once daily 1 Bottle 6     semaglutide 4 MG/3ML Subcutaneous Solution Pen-injector Inject 1 mg into the skin once a week. 9 mL 0      Allergies[1]   Past Medical History:    Allergic rhinitis    Arthritis    Diabetes (HCC)    Diabetes mellitus (HCC)    Essential hypertension    High cholesterol    History of colon polyps    Hyperlipidemia    Hypertension    Irregular bowel habits    Obesity    MADY (obstructive sleep apnea)    on CPAP 9cm    Wears glasses    Weight gain    After taking Type 2 Diabetes Medicines      Patient Active Problem List   Diagnosis    History of colon polyps    Allergic rhinitis    Sleep apnea, obstructive    Obesity, unspecified    Essential hypertension, benign    Mixed hyperlipidemia    Type 2 diabetes mellitus with microalbuminuria, without long-term current use of insulin (HCC)    Sinusitis, chronic    History of adenomatous polyp of colon    Abscess of anal and rectal regions    Anal fistula    Asthma without status asthmaticus ([facility])    Dysphagia, unspecified    Gastric ulcer, acute with hemorrhage      Past Surgical History:   Procedure Laterality Date    Colonoscopy  2008    Colonoscopy  01/11/2017    adenomatous polyp, diverticuli and int hem    Other surgical history      debviated septum, nasal surgery    Other surgical history      arthroscopic knee surgery right      Family History   Problem Relation Age of Onset    Prostate Cancer Father     Diabetes Mother     Hypertension Mother       Social History     Socioeconomic History    Marital status:     Number of children: 2   Occupational History    Occupation: [employer]    Tobacco Use    Smoking status: Never    Smokeless tobacco: Never   Vaping Use    Vaping status: Never Used   Substance and Sexual Activity    Alcohol use: Yes     Alcohol/week: 1.0 standard drink of alcohol     Types: 1 Glasses of wine per week     Comment: CAGE 8/3/20    Drug use: Never    Sexual activity: Not Currently         REVIEW OF SYSTEMS:   GENERAL: feels  well otherwise  SKIN: see HPI  EYES: no new vision changes  HEENT: not congested  LUNGS: no new dyspnea  CARDIOVASCULAR: no new chest pain    EXAM:   /80 (BP Location: Right arm, Patient Position: Sitting, Cuff Size: large)   Pulse 92   Temp 97 °F (36.1 °C) (Temporal)   Wt (!) 324 lb (147 kg)   SpO2 97%   BMI 42.75 kg/m²   GENERAL: Well developed, well nourished,in no apparent distress  SKIN: No rashes,no suspicious lesions  EYES: PERRLA, EOMI, conjunctiva are clear  HEENT: atraumatic, normocephalic,ears and throat are clear  NECK: supple,no adenopathy,no bruits  LUNGS: clear to auscultation  CARDIO: RRR without murmur  GI: good BS's,no masses, HSM or tenderness    ASSESSMENT AND PLAN:   Tye Sylvester is a 64 year old male who presents for CPE    Wellness examination  Discussed age-appropriate health and wellness.    Type 2 diabetes mellitus with microalbuminuria, without long-term current use of insulin ([facility])  Not well-controlled currently.  He has been out of Ozempic, refill sent today.  We will increase his glargine to 25 units nightly and continue  his current Janumet and pioglitazone.  Will obtain eye exam record which she believes he had done this year.  He will update me in 1 week with his blood sugars and we will continue to titrate his insulin and Ozempic dose.  - semaglutide 4 MG/3ML Subcutaneous Solution Pen-injector; Inject 1 mg into the skin once a week.  Dispense: 9 mL; Refill: 0    Lipoma of neck  Has upcoming evaluation with general surgery next week.    Mixed hyperlipidemia  Continue statin.    Essential hypertension, benign  Overall controlled on current treatment.    Mild intermittent asthma without status asthmaticus without complication ([facility])  ACT score 25 today. Controlled.    History of colon polyps  Colonoscopy up-to-date.    Sleep apnea, obstructive  Stable on CPAP.            All questions were answered and the patient agrees with the plan.     Thank you,  Michael Ashton,  MD         [1]   Allergies  Allergen Reactions    Penicillins HIVES    Cephalosporins UNKNOWN

## 2024-12-12 ENCOUNTER — TELEPHONE (OUTPATIENT)
Dept: INTERNAL MEDICINE CLINIC | Facility: CLINIC | Age: 64
End: 2024-12-12

## 2024-12-12 RX ORDER — PEN NEEDLE, DIABETIC 30 GX3/16"
1 NEEDLE, DISPOSABLE MISCELLANEOUS NIGHTLY
Qty: 100 EACH | Refills: 3 | OUTPATIENT
Start: 2024-12-12

## 2024-12-12 RX ORDER — ROSUVASTATIN CALCIUM 10 MG/1
10 TABLET, COATED ORAL NIGHTLY
Qty: 90 TABLET | Refills: 3 | OUTPATIENT
Start: 2024-12-12

## 2024-12-12 NOTE — TELEPHONE ENCOUNTER
Rosuvastatin 10m year supply [facility] on [address] On 2024    Pen needles: 1 year supply sent to [facility] [address] on 2024

## 2024-12-19 ENCOUNTER — OFFICE VISIT (OUTPATIENT)
Facility: LOCATION | Age: 64
End: 2024-12-19
Payer: COMMERCIAL

## 2024-12-19 VITALS
HEART RATE: 94 BPM | BODY MASS INDEX: 41.75 KG/M2 | WEIGHT: 315 LBS | TEMPERATURE: 98 F | DIASTOLIC BLOOD PRESSURE: 74 MMHG | HEIGHT: 73 IN | OXYGEN SATURATION: 96 % | SYSTOLIC BLOOD PRESSURE: 153 MMHG

## 2024-12-19 DIAGNOSIS — R22.1 NECK MASS: Primary | ICD-10-CM

## 2024-12-19 PROCEDURE — 99202 OFFICE O/P NEW SF 15 MIN: CPT | Performed by: STUDENT IN AN ORGANIZED HEALTH CARE EDUCATION/TRAINING PROGRAM

## 2024-12-19 PROCEDURE — 3078F DIAST BP <80 MM HG: CPT | Performed by: STUDENT IN AN ORGANIZED HEALTH CARE EDUCATION/TRAINING PROGRAM

## 2024-12-19 PROCEDURE — 3077F SYST BP >= 140 MM HG: CPT | Performed by: STUDENT IN AN ORGANIZED HEALTH CARE EDUCATION/TRAINING PROGRAM

## 2024-12-19 PROCEDURE — 3008F BODY MASS INDEX DOCD: CPT | Performed by: STUDENT IN AN ORGANIZED HEALTH CARE EDUCATION/TRAINING PROGRAM

## 2024-12-19 NOTE — H&P
New Patient Visit Note       Active Problems      1. Neck mass        Chief Complaint   Chief Complaint   Patient presents with    New Patient     NP - Lipoma of neck, CT on 11/13, pt has stiff neck for 3 months, slight pain, no other symptoms.       History of Present Illness   64 year old male who is here for evaluation of a mass on his neck. He reports feeling stiffness in his right posterior neck over the past few months. This initially was intermittent and now constant. He initially attributed that to his stressful job however recently noted a lump in the right posterior neck. The lump is not painful. This is not tender. There is no radiation to the pain but the stiffness does start from the base of the neck to the base of the shantel. He underwent a CT scan of the neck that shows a lipoma in the right posterior inferior neck correlating to the palpable abnormality. This does not have any signs of malignancy.       Allergies  Tye is allergic to penicillins and cephalosporins.    Past Medical / Surgical / Social / Family History    The past medical and past surgical history have been reviewed by me today.    Past Medical History:    Allergic rhinitis    Arthritis    Asthma (HCC)    Diabetes (HCC)    Diabetes mellitus (HCC)    Diverticulosis of intestine    Essential hypertension    High blood pressure    High cholesterol    History of colon polyps    History of stomach ulcers    Hyperlipidemia    Hypertension    Irregular bowel habits    Obesity    MADY (obstructive sleep apnea)    on CPAP 9cm    Sleep apnea    Wears glasses    Weight gain    After taking Type 2 Diabetes Medicines     Past Surgical History:   Procedure Laterality Date    Colonoscopy  2008    Colonoscopy  01/11/2017    adenomatous polyp, diverticuli and int hem    Knee arthroscopy  1988    Other surgical history      debviated septum, nasal surgery    Other surgical history      arthroscopic knee surgery right    Stomach surgery procedure  unlisted  1990       The family history and social history have been reviewed by me today.    Family History   Problem Relation Age of Onset    Prostate Cancer Father     Diabetes Mother     Hypertension Mother      Social History     Socioeconomic History    Marital status:     Number of children: 2   Occupational History    Occupation: [employer]    Tobacco Use    Smoking status: Never    Smokeless tobacco: Never   Vaping Use    Vaping status: Never Used   Substance and Sexual Activity    Alcohol use: Yes     Alcohol/week: 2.0 standard drinks of alcohol     Types: 1 Glasses of wine, 1 Cans of beer per week     Comment: CAGE 8/3/20    Drug use: Never    Sexual activity: Not Currently   Other Topics Concern    Caffeine Concern No    Exercise No    Seat Belt No    Special Diet No    Stress Concern No    Weight Concern Yes        Current Outpatient Medications:     Continuous Glucose Sensor (FREESTYLE CELESTINE 3 SENSOR) Does not apply Misc, 1 each every 14 (fourteen) days. Apply sensor every 14 days, Disp: 6 each, Rfl: 3    semaglutide 4 MG/3ML Subcutaneous Solution Pen-injector, Inject 1 mg into the skin once a week., Disp: 9 mL, Rfl: 0    cyclobenzaprine 5 MG Oral Tab, Take 1 tablet (5 mg total) by mouth 3 (three) times daily as needed for Muscle spasms., Disp: 30 tablet, Rfl: 0    SITagliptin-metFORMIN HCl (JANUMET)  MG Oral Tab, Take 1 tablet by mouth 2 (two) times daily with meals., Disp: 180 tablet, Rfl: 3    Insulin Pen Needle (PEN NEEDLES) 32G X 4 MM Does not apply Misc, Inject 1 Needle into the skin at bedtime., Disp: 100 each, Rfl: 3    pioglitazone 30 MG Oral Tab, Take 1 tablet (30 mg total) by mouth daily., Disp: 90 tablet, Rfl: 3    rosuvastatin 10 MG Oral Tab, Take 1 tablet (10 mg total) by mouth nightly., Disp: 90 tablet, Rfl: 3    predniSONE 5 MG Oral Tab, Take 1 tablet (5 mg total) by mouth daily., Disp: 10 tablet, Rfl: 0    clarithromycin 500 MG Oral Tab, Take 1 tablet (500  mg total) by mouth 2 (two) times daily., Disp: 20 tablet, Rfl: 0    Insulin Glargine-yfgn 100 UNIT/ML Subcutaneous Solution Pen-injector, Inject 20 Units into the skin nightly., Disp: 21 mL, Rfl: 3    Fosinopril Sodium 20 MG Oral Tab, Take 1 tablet (20 mg total) by mouth daily., Disp: 90 tablet, Rfl: 3    pantoprazole 20 MG Oral Tab EC, Take 1 tablet (20 mg total) by mouth before breakfast., Disp: 90 tablet, Rfl: 0    PREVIDENT 5000 DRY MOUTH 1.1 % Dental Gel, , Disp: , Rfl:     Fluticasone Propionate 50 MCG/ACT Nasal Suspension, Two sprays in each nostril once daily, Disp: 1 Bottle, Rfl: 6      Review of Systems  The Review of Systems has been reviewed by me during today.  Review of Systems   Constitutional:  Negative for chills, diaphoresis, fatigue and fever.   HENT:  Negative for ear discharge, ear pain and sore throat.         Neck stiffness   Eyes:  Negative for pain and discharge.   Respiratory:  Negative for cough, chest tightness and shortness of breath.    Cardiovascular:  Negative for chest pain, palpitations and leg swelling.   Gastrointestinal:  Negative for abdominal distention, abdominal pain, blood in stool, constipation, diarrhea, nausea and vomiting.   Genitourinary:  Negative for dysuria, frequency, hematuria and urgency.   Skin:  Negative for color change, pallor and rash.   Neurological:  Negative for weakness, light-headedness, numbness and headaches.   Hematological:  Negative for adenopathy. Does not bruise/bleed easily.   Psychiatric/Behavioral:  Negative for agitation and confusion.        Physical Findings   /74 (BP Location: Left arm, Patient Position: Sitting, Cuff Size: large)   Pulse 94   Temp 97.8 °F (36.6 °C) (Temporal)   Ht 73\"   Wt (!) 324 lb (147 kg)   SpO2 96%   BMI 42.75 kg/m²   Physical Exam  Constitutional:       Appearance: Normal appearance.   HENT:      Head: Normocephalic and atraumatic.   Cardiovascular:      Pulses: Normal pulses.   Pulmonary:      Effort:  Pulmonary effort is normal.   Musculoskeletal:        Arms:       Comments: Right posterior inferior mass that is well circumscribed, non tender, approximately 3 cms in size   Skin:     General: Skin is warm.      Capillary Refill: Capillary refill takes less than 2 seconds.   Neurological:      Mental Status: He is alert and oriented to person, place, and time. Mental status is at baseline.             Assessment/Plan  1. Neck mass        Tye Sylvester is a 64 year old male referred by Michael Ashton MD for evaluation of neck mass. This is symptomatic and he would like to have it removed. I recommend proceeding with posterior neck mass excision in the operating room with general anesthesia. The risks of lesion excision were discussed with the patient and include but are not limited to bleeding, hematoma, infection, incision separation, incomplete excision, recurrence, injury to surrounding structures, need for additional surgery. The patient voiced understanding and agreed to proceed.           Nina Dash MD

## 2024-12-19 NOTE — H&P (VIEW-ONLY)
New Patient Visit Note       Active Problems      1. Neck mass        Chief Complaint   Chief Complaint   Patient presents with    New Patient     NP - Lipoma of neck, CT on 11/13, pt has stiff neck for 3 months, slight pain, no other symptoms.       History of Present Illness   64 year old male who is here for evaluation of a mass on his neck. He reports feeling stiffness in his right posterior neck over the past few months. This initially was intermittent and now constant. He initially attributed that to his stressful job however recently noted a lump in the right posterior neck. The lump is not painful. This is not tender. There is no radiation to the pain but the stiffness does start from the base of the neck to the base of the shantel. He underwent a CT scan of the neck that shows a lipoma in the right posterior inferior neck correlating to the palpable abnormality. This does not have any signs of malignancy.       Allergies  Tye is allergic to penicillins and cephalosporins.    Past Medical / Surgical / Social / Family History    The past medical and past surgical history have been reviewed by me today.    Past Medical History:    Allergic rhinitis    Arthritis    Asthma (HCC)    Diabetes (HCC)    Diabetes mellitus (HCC)    Diverticulosis of intestine    Essential hypertension    High blood pressure    High cholesterol    History of colon polyps    History of stomach ulcers    Hyperlipidemia    Hypertension    Irregular bowel habits    Obesity    MADY (obstructive sleep apnea)    on CPAP 9cm    Sleep apnea    Wears glasses    Weight gain    After taking Type 2 Diabetes Medicines     Past Surgical History:   Procedure Laterality Date    Colonoscopy  2008    Colonoscopy  01/11/2017    adenomatous polyp, diverticuli and int hem    Knee arthroscopy  1988    Other surgical history      debviated septum, nasal surgery    Other surgical history      arthroscopic knee surgery right    Stomach surgery procedure  unlisted  1990       The family history and social history have been reviewed by me today.    Family History   Problem Relation Age of Onset    Prostate Cancer Father     Diabetes Mother     Hypertension Mother      Social History     Socioeconomic History    Marital status:     Number of children: 2   Occupational History    Occupation: [employer]    Tobacco Use    Smoking status: Never    Smokeless tobacco: Never   Vaping Use    Vaping status: Never Used   Substance and Sexual Activity    Alcohol use: Yes     Alcohol/week: 2.0 standard drinks of alcohol     Types: 1 Glasses of wine, 1 Cans of beer per week     Comment: CAGE 8/3/20    Drug use: Never    Sexual activity: Not Currently   Other Topics Concern    Caffeine Concern No    Exercise No    Seat Belt No    Special Diet No    Stress Concern No    Weight Concern Yes        Current Outpatient Medications:     Continuous Glucose Sensor (FREESTYLE CELESTINE 3 SENSOR) Does not apply Misc, 1 each every 14 (fourteen) days. Apply sensor every 14 days, Disp: 6 each, Rfl: 3    semaglutide 4 MG/3ML Subcutaneous Solution Pen-injector, Inject 1 mg into the skin once a week., Disp: 9 mL, Rfl: 0    cyclobenzaprine 5 MG Oral Tab, Take 1 tablet (5 mg total) by mouth 3 (three) times daily as needed for Muscle spasms., Disp: 30 tablet, Rfl: 0    SITagliptin-metFORMIN HCl (JANUMET)  MG Oral Tab, Take 1 tablet by mouth 2 (two) times daily with meals., Disp: 180 tablet, Rfl: 3    Insulin Pen Needle (PEN NEEDLES) 32G X 4 MM Does not apply Misc, Inject 1 Needle into the skin at bedtime., Disp: 100 each, Rfl: 3    pioglitazone 30 MG Oral Tab, Take 1 tablet (30 mg total) by mouth daily., Disp: 90 tablet, Rfl: 3    rosuvastatin 10 MG Oral Tab, Take 1 tablet (10 mg total) by mouth nightly., Disp: 90 tablet, Rfl: 3    predniSONE 5 MG Oral Tab, Take 1 tablet (5 mg total) by mouth daily., Disp: 10 tablet, Rfl: 0    clarithromycin 500 MG Oral Tab, Take 1 tablet (500  mg total) by mouth 2 (two) times daily., Disp: 20 tablet, Rfl: 0    Insulin Glargine-yfgn 100 UNIT/ML Subcutaneous Solution Pen-injector, Inject 20 Units into the skin nightly., Disp: 21 mL, Rfl: 3    Fosinopril Sodium 20 MG Oral Tab, Take 1 tablet (20 mg total) by mouth daily., Disp: 90 tablet, Rfl: 3    pantoprazole 20 MG Oral Tab EC, Take 1 tablet (20 mg total) by mouth before breakfast., Disp: 90 tablet, Rfl: 0    PREVIDENT 5000 DRY MOUTH 1.1 % Dental Gel, , Disp: , Rfl:     Fluticasone Propionate 50 MCG/ACT Nasal Suspension, Two sprays in each nostril once daily, Disp: 1 Bottle, Rfl: 6      Review of Systems  The Review of Systems has been reviewed by me during today.  Review of Systems   Constitutional:  Negative for chills, diaphoresis, fatigue and fever.   HENT:  Negative for ear discharge, ear pain and sore throat.         Neck stiffness   Eyes:  Negative for pain and discharge.   Respiratory:  Negative for cough, chest tightness and shortness of breath.    Cardiovascular:  Negative for chest pain, palpitations and leg swelling.   Gastrointestinal:  Negative for abdominal distention, abdominal pain, blood in stool, constipation, diarrhea, nausea and vomiting.   Genitourinary:  Negative for dysuria, frequency, hematuria and urgency.   Skin:  Negative for color change, pallor and rash.   Neurological:  Negative for weakness, light-headedness, numbness and headaches.   Hematological:  Negative for adenopathy. Does not bruise/bleed easily.   Psychiatric/Behavioral:  Negative for agitation and confusion.        Physical Findings   /74 (BP Location: Left arm, Patient Position: Sitting, Cuff Size: large)   Pulse 94   Temp 97.8 °F (36.6 °C) (Temporal)   Ht 73\"   Wt (!) 324 lb (147 kg)   SpO2 96%   BMI 42.75 kg/m²   Physical Exam  Constitutional:       Appearance: Normal appearance.   HENT:      Head: Normocephalic and atraumatic.   Cardiovascular:      Pulses: Normal pulses.   Pulmonary:      Effort:  Pulmonary effort is normal.   Musculoskeletal:        Arms:       Comments: Right posterior inferior mass that is well circumscribed, non tender, approximately 3 cms in size   Skin:     General: Skin is warm.      Capillary Refill: Capillary refill takes less than 2 seconds.   Neurological:      Mental Status: He is alert and oriented to person, place, and time. Mental status is at baseline.             Assessment/Plan  1. Neck mass        Tye Sylvester is a 64 year old male referred by Michael Ashton MD for evaluation of neck mass. This is symptomatic and he would like to have it removed. I recommend proceeding with posterior neck mass excision in the operating room with general anesthesia. The risks of lesion excision were discussed with the patient and include but are not limited to bleeding, hematoma, infection, incision separation, incomplete excision, recurrence, injury to surrounding structures, need for additional surgery. The patient voiced understanding and agreed to proceed.           Nina Dash MD

## 2024-12-24 ENCOUNTER — EKG ENCOUNTER (OUTPATIENT)
Dept: LAB | Age: 64
End: 2024-12-24
Attending: STUDENT IN AN ORGANIZED HEALTH CARE EDUCATION/TRAINING PROGRAM
Payer: COMMERCIAL

## 2024-12-24 DIAGNOSIS — Z01.818 PRE-OP TESTING: ICD-10-CM

## 2024-12-24 PROCEDURE — 93005 ELECTROCARDIOGRAM TRACING: CPT

## 2024-12-24 PROCEDURE — 93010 ELECTROCARDIOGRAM REPORT: CPT | Performed by: INTERNAL MEDICINE

## 2024-12-25 LAB
ATRIAL RATE: 77 BPM
P AXIS: 26 DEGREES
P-R INTERVAL: 178 MS
Q-T INTERVAL: 408 MS
QRS DURATION: 110 MS
QTC CALCULATION (BEZET): 461 MS
R AXIS: -24 DEGREES
T AXIS: 28 DEGREES
VENTRICULAR RATE: 77 BPM

## 2024-12-29 ENCOUNTER — HOSPITAL ENCOUNTER (OUTPATIENT)
Age: 64
Discharge: HOME | End: 2024-12-29
Payer: COMMERCIAL

## 2024-12-29 ENCOUNTER — APPOINTMENT (OUTPATIENT)
Dept: GENERAL RADIOLOGY | Age: 64
End: 2024-12-29
Attending: PHYSICIAN ASSISTANT
Payer: COMMERCIAL

## 2024-12-29 VITALS
HEART RATE: 75 BPM | DIASTOLIC BLOOD PRESSURE: 73 MMHG | OXYGEN SATURATION: 99 % | TEMPERATURE: 99 F | SYSTOLIC BLOOD PRESSURE: 150 MMHG | RESPIRATION RATE: 18 BRPM

## 2024-12-29 DIAGNOSIS — R05.1 ACUTE COUGH: ICD-10-CM

## 2024-12-29 DIAGNOSIS — J40 BRONCHITIS: Primary | ICD-10-CM

## 2024-12-29 LAB — SARS-COV-2 RNA RESP QL NAA+PROBE: NOT DETECTED

## 2024-12-29 PROCEDURE — 71046 X-RAY EXAM CHEST 2 VIEWS: CPT | Performed by: PHYSICIAN ASSISTANT

## 2024-12-29 PROCEDURE — 99214 OFFICE O/P EST MOD 30 MIN: CPT | Performed by: PHYSICIAN ASSISTANT

## 2024-12-29 PROCEDURE — U0002 COVID-19 LAB TEST NON-CDC: HCPCS | Performed by: PHYSICIAN ASSISTANT

## 2024-12-29 PROCEDURE — 94640 AIRWAY INHALATION TREATMENT: CPT | Performed by: PHYSICIAN ASSISTANT

## 2024-12-29 RX ORDER — PREDNISONE 20 MG/1
60 TABLET ORAL ONCE
Status: COMPLETED | OUTPATIENT
Start: 2024-12-29 | End: 2024-12-29

## 2024-12-29 RX ORDER — ALBUTEROL SULFATE 90 UG/1
2 INHALANT RESPIRATORY (INHALATION) EVERY 4 HOURS PRN
Qty: 1 EACH | Refills: 0 | Status: SHIPPED | OUTPATIENT
Start: 2024-12-29 | End: 2025-01-28

## 2024-12-29 RX ORDER — IPRATROPIUM BROMIDE AND ALBUTEROL SULFATE 2.5; .5 MG/3ML; MG/3ML
3 SOLUTION RESPIRATORY (INHALATION) ONCE
Status: COMPLETED | OUTPATIENT
Start: 2024-12-29 | End: 2024-12-29

## 2024-12-29 RX ORDER — PREDNISONE 20 MG/1
40 TABLET ORAL DAILY
Qty: 8 TABLET | Refills: 0 | Status: SHIPPED | OUTPATIENT
Start: 2024-12-29 | End: 2025-01-02

## 2024-12-29 NOTE — DISCHARGE INSTRUCTIONS
Please follow up with your PCP if no improvement within 5-7 days. Go directly to the ER for any acute worsening of symptoms.    If you smoke, stop smoking.  Push oral fluids to help loosen up chest mucous and move it out of your body.  Use a cold mist humidifier.  Get enough rest and sleep.  Take Guaifenesin (Robitussin), an expectorant to loosen mucous.  To suppress a dry, hacking cough, you may take Dextromethorphan (Robitussin DM)  Seek immediate medical attention if symptoms worsen, if fever higher than 102, if no improvement in a few days.  Symptoms usually last 10 days.

## 2024-12-29 NOTE — ED INITIAL ASSESSMENT (HPI)
Pt c/o \"chest cold\", cough and congestion, concerned because he feels it in his chest, and has surgery scheduled for 1/13/25, therefore, doesn't want it to get worse, denies fevers

## 2024-12-29 NOTE — ED PROVIDER NOTES
Patient Seen in: [facility]      History     Chief Complaint   Patient presents with    Cough/URI     Stated Complaint: Cold    Subjective:   HPI  64-year-old male with known history of hypertension, diabetes, hyperlipidemia obstructive sleep apnea and asthma who comes in today complaining of productive and worsening cough over the past 9 days.  Patient denies fevers and chills.  Patient denies known sick contacts.  Patient is concerned because he has a surgical intervention scheduled in 2 weeks.  He states his inhalers are  and has not been taking them.    Objective:     Past Medical History:    Allergic rhinitis    Arthritis    Asthma (HCC)    Diabetes (HCC)    Diabetes mellitus (HCC)    Diverticulosis of intestine    Essential hypertension    High blood pressure    High cholesterol    History of colon polyps    History of stomach ulcers    Hyperlipidemia    Hypertension    Irregular bowel habits    Obesity    MADY (obstructive sleep apnea)    on CPAP 9cm    PONV (postoperative nausea and vomiting)    Sleep apnea    CPAP    Visual impairment    GLASSES    Wears glasses    Weight gain    After taking Type 2 Diabetes Medicines              Past Surgical History:   Procedure Laterality Date    Colonoscopy      Colonoscopy  2017    adenomatous polyp, diverticuli and int hem    Knee arthroscopy      Other surgical history      deviated septum, nasal surgery    Other surgical history      arthroscopic knee surgery right    Stomach surgery procedure unlisted                  Social History     Socioeconomic History    Marital status:     Number of children: 2   Occupational History    Occupation: [employer]    Tobacco Use    Smoking status: Never    Smokeless tobacco: Never   Vaping Use    Vaping status: Never Used   Substance and Sexual Activity    Alcohol use: Yes     Alcohol/week: 2.0 standard drinks of alcohol     Types: 1 Glasses of wine, 1 Cans of  beer per week     Comment: RARE    Drug use: Never    Sexual activity: Not Currently   Other Topics Concern    Caffeine Concern No    Exercise No    Seat Belt No    Special Diet No    Stress Concern No    Weight Concern Yes              Review of Systems    Positive for stated complaint: Cold  Other systems are as noted in HPI.  Constitutional and vital signs reviewed.      All other systems reviewed and negative except as noted above.    Physical Exam     ED Triage Vitals [12/29/24 0934]   /73   Pulse 75   Resp 18   Temp 99.3 °F (37.4 °C)   Temp src Oral   SpO2 99 %   O2 Device None (Room air)       Current Vitals:   Vital Signs  BP: 150/73  Pulse: 75  Resp: 18  Temp: 99.3 °F (37.4 °C)  Temp src: Oral    Oxygen Therapy  SpO2: 99 %  O2 Device: None (Room air)        Physical Exam  General Appearance: Alert, cooperative, no distress, appropriate for age   Head: Normocephalic, without obvious abnormality   Eyes: PERRL,  conjunctiva and cornea clear, both eyes   Ears: TM pearly gray color and semitransparent, external ear canals normal, both ears   Nose: Nares symmetrical, septum midline, mucosa normal, clear watery discharge; no sinus tenderness   Throat: Lips, tongue, and mucosa are moist, pink, and intact; teeth intact. No erythema, no exudates or tonsillar hypertrophy, uvula midline, no trismus or drooling no phonation changes, patient handling secretions well   Neck: Supple; no anterior or posterior cervical adenopathy, no neck rigidity or meningeal signs  Lungs: Expiratory wheeze bilaterally with mild crackles in the right lower base  Heart: NSR, S1, S2 present. No murmurs, rubs or gallops.  Skin: no rash       ED Course     Labs Reviewed   RAPID SARS-COV-2 BY PCR - Normal     XR CHEST PA + LAT CHEST (CPT=71046)    Result Date: 12/29/2024  PROCEDURE:  XR CHEST PA + LAT CHEST (CPT=71046)  INDICATIONS:  Cold type symptoms  COMPARISON:  None.  TECHNIQUE:  PA and lateral chest radiographs were obtained.   PATIENT STATED HISTORY: (As transcribed by Technologist)  Pt. c/o cough and congestion. Hx of athma.               CONCLUSION:  Normal cardiac and mediastinal contours.  No pulmonary edema or focal airspace consolidation.  The pleural spaces are clear.   LOCATION:  [facility]   Dictated by (CST): Rosa Mancilla MD on 12/29/2024 at 10:18 AM     Finalized by (CST): Rosa Mancilla MD on 12/29/2024 at 10:19 AM             MDM          Medical Decision Making  64-year-old male with known history of asthma who comes in today with upper respiratory symptoms and productive cough for the past 7 to 10 days.    Problems Addressed:  Bronchitis: acute illness or injury    Amount and/or Complexity of Data Reviewed  Labs: ordered. Decision-making details documented in ED Course.     Details: Covid neg   Radiology: ordered and independent interpretation performed. Decision-making details documented in ED Course.     Details: I personally reviewed the patients CXR no evidence of acute consolidation or pleural effusion  ECG/medicine tests: ordered and independent interpretation performed. Decision-making details documented in ED Course.     Details: Duoneb and prednsione for wheezing patient reevaluated lung sounds clear to auscultation bilaterally    Risk  OTC drugs.  Prescription drug management.  Risk Details: Clinical Impression: Bronchitis       The differential diagnosis before testing included Pneumonia, COVID-19, Bronchitis , which is a medical condition that poses a threat to life/function.            Disposition and Plan     Clinical Impression:  1. Bronchitis    2. Acute cough         Disposition:  Discharge  12/29/2024 10:33 am    Follow-up:  Michael Ashton MD  [address] [address]  [phone]    Schedule an appointment as soon as possible for a visit   If symptoms worsen          Medications Prescribed:  Discharge Medication List as of 12/29/2024 10:35 AM        START taking these medications    Details    albuterol 108 (90 Base) MCG/ACT Inhalation Aero Soln Inhale 2 puffs into the lungs every 4 (four) hours as needed for Wheezing., Normal, Disp-1 each, R-0      predniSONE 20 MG Oral Tab Take 2 tablets (40 mg total) by mouth daily for 4 days., Normal, Disp-8 tablet, R-0                 Supplementary Documentation:

## 2024-12-30 ENCOUNTER — TELEPHONE (OUTPATIENT)
Facility: LOCATION | Age: 64
End: 2024-12-30

## 2024-12-30 DIAGNOSIS — R22.1 NECK MASS: Primary | ICD-10-CM

## 2024-12-30 NOTE — TELEPHONE ENCOUNTER
JEANMARIE PARRY Patient  Member ID  [id number]    Date of Birth  1960-09-09    Gender  Male    Transaction Type  Outpatient Authorization    Organization  [facility]    Payer  [facility] logo     Certificate Information  Reference Number  [id number]    Status  NO ACTION REQUIRED    Message  Requested Service does not require preauthorization. We would strongly encourage you to check benefits for this service.    Member Information  Patient Name  JEANMARIE PARRY    Patient Date of Birth  1960-09-09    Patient Gender  Male    Member ID  [id number]    Relationship to Subscriber  Self    Subscriber Name  JEANMARIE PARRY    Requesting Provider     Name  AMISHA MILES    NPI  [id number]    Tax Id  [id number]    Specialty  [id number]    Provider Role  Provider    Address  [facility] [place] [address]    Phone  [phone]    Fax  [phone]    Contact Name  ANN OJEDA    Service Information  Service Type  2 - Surgical    Place of Service  [address] - [facility]    Service From - To Date  2025-01-13 - 2025-01-31    Level of Service  Elective    Diagnosis Code 1  R221 - Localized swelling mass and lump neck    Procedure Code 1 (CPT/HCPCS)  69608 - EXC NECK LES SC 3 CM/>    Quantity  1 Units    Status  NO ACTION REQUIRED

## 2025-01-03 ENCOUNTER — HOSPITAL ENCOUNTER (OUTPATIENT)
Age: 65
Discharge: HOME | End: 2025-01-03
Payer: COMMERCIAL

## 2025-01-03 ENCOUNTER — APPOINTMENT (OUTPATIENT)
Dept: GENERAL RADIOLOGY | Age: 65
End: 2025-01-03
Attending: PHYSICIAN ASSISTANT
Payer: COMMERCIAL

## 2025-01-03 VITALS
SYSTOLIC BLOOD PRESSURE: 136 MMHG | BODY MASS INDEX: 40.43 KG/M2 | OXYGEN SATURATION: 98 % | HEIGHT: 74 IN | WEIGHT: 315 LBS | DIASTOLIC BLOOD PRESSURE: 82 MMHG | HEART RATE: 85 BPM | RESPIRATION RATE: 18 BRPM | TEMPERATURE: 99 F

## 2025-01-03 DIAGNOSIS — R05.1 ACUTE COUGH: ICD-10-CM

## 2025-01-03 DIAGNOSIS — J32.9 SINOBRONCHITIS: Primary | ICD-10-CM

## 2025-01-03 DIAGNOSIS — J40 SINOBRONCHITIS: Primary | ICD-10-CM

## 2025-01-03 PROCEDURE — 99213 OFFICE O/P EST LOW 20 MIN: CPT | Performed by: PHYSICIAN ASSISTANT

## 2025-01-03 PROCEDURE — 71046 X-RAY EXAM CHEST 2 VIEWS: CPT | Performed by: PHYSICIAN ASSISTANT

## 2025-01-03 PROCEDURE — 94640 AIRWAY INHALATION TREATMENT: CPT | Performed by: PHYSICIAN ASSISTANT

## 2025-01-03 RX ORDER — FLUTICASONE PROPIONATE 50 MCG
2 SPRAY, SUSPENSION (ML) NASAL DAILY
Qty: 16 G | Refills: 0 | Status: SHIPPED | OUTPATIENT
Start: 2025-01-03 | End: 2025-02-02

## 2025-01-03 RX ORDER — IPRATROPIUM BROMIDE AND ALBUTEROL SULFATE 2.5; .5 MG/3ML; MG/3ML
3 SOLUTION RESPIRATORY (INHALATION) ONCE
Status: COMPLETED | OUTPATIENT
Start: 2025-01-03 | End: 2025-01-03

## 2025-01-03 RX ORDER — BENZONATATE 200 MG/1
200 CAPSULE ORAL 3 TIMES DAILY PRN
Qty: 20 CAPSULE | Refills: 0 | Status: SHIPPED | OUTPATIENT
Start: 2025-01-03

## 2025-01-03 RX ORDER — DOXYCYCLINE 100 MG/1
100 CAPSULE ORAL 2 TIMES DAILY
Qty: 14 CAPSULE | Refills: 0 | Status: SHIPPED | OUTPATIENT
Start: 2025-01-03 | End: 2025-01-13

## 2025-01-03 RX ORDER — PREDNISONE 20 MG/1
TABLET ORAL
Qty: 11 TABLET | Refills: 0 | Status: SHIPPED | OUTPATIENT
Start: 2025-01-03 | End: 2025-01-13

## 2025-01-03 NOTE — ED PROVIDER NOTES
Patient Seen in: Immediate Care [facility]      History     Chief Complaint   Patient presents with    Cough/URI     Stated Complaint: congestion    Subjective:   HPI      64-year-old male with past med history of allergic rhinitis, diabetes, hypertension, hyperlipidemia, asthma  presents to the immediate care due to copious nasal congestion for the past 2 weeks. Associated PND. Productive cough with wheezing some improvement with albuterol inhaler. No new fevers. Pt was recently evaluated in the Immediate care last week - cxr negative. Started on prednisone with little improvement. No new sick contacts.     Objective:     Past Medical History:    Allergic rhinitis    Arthritis    Asthma (HCC)    Diabetes (HCC)    Diabetes mellitus (HCC)    Diverticulosis of intestine    Essential hypertension    High blood pressure    High cholesterol    History of colon polyps    History of stomach ulcers    Hyperlipidemia    Hypertension    Irregular bowel habits    Obesity    MADY (obstructive sleep apnea)    on CPAP 9cm    PONV (postoperative nausea and vomiting)    Sleep apnea    CPAP    Visual impairment    GLASSES    Wears glasses    Weight gain    After taking Type 2 Diabetes Medicines              Past Surgical History:   Procedure Laterality Date    Colonoscopy  2008    Colonoscopy  01/11/2017    adenomatous polyp, diverticuli and int hem    Knee arthroscopy  1988    Other surgical history      deviated septum, nasal surgery    Other surgical history      arthroscopic knee surgery right    Stomach surgery procedure unlisted  1990                Social History     Socioeconomic History    Marital status:     Number of children: 2   Occupational History    Occupation: [employer]    Tobacco Use    Smoking status: Never    Smokeless tobacco: Never   Vaping Use    Vaping status: Never Used   Substance and Sexual Activity    Alcohol use: Yes     Alcohol/week: 2.0 standard drinks of alcohol     Types:  1 Glasses of wine, 1 Cans of beer per week     Comment: RARE    Drug use: Never    Sexual activity: Not Currently   Other Topics Concern    Caffeine Concern No    Exercise No    Seat Belt No    Special Diet No    Stress Concern No    Weight Concern Yes              Review of Systems   Constitutional:  Negative for chills, fatigue and fever.   HENT:  Positive for congestion, postnasal drip, sinus pressure and sinus pain. Negative for ear discharge, ear pain, sore throat, trouble swallowing and voice change.    Respiratory:  Positive for cough and wheezing. Negative for shortness of breath and stridor.    Cardiovascular: Negative.        Positive for stated complaint: congestion  Other systems are as noted in HPI.  Constitutional and vital signs reviewed.      All other systems reviewed and negative except as noted above.    Physical Exam     ED Triage Vitals [01/03/25 1243]   /82   Pulse 85   Resp 18   Temp 98.7 °F (37.1 °C)   Temp src Oral   SpO2 98 %   O2 Device None (Room air)       Current Vitals:   Vital Signs  BP: 136/82  Pulse: 85  Resp: 18  Temp: 98.7 °F (37.1 °C)  Temp src: Oral    Oxygen Therapy  SpO2: 98 %  O2 Device: None (Room air)        Physical Exam  Vitals and nursing note reviewed.   Constitutional:       General: He is not in acute distress.     Appearance: Normal appearance.   HENT:      Head: Normocephalic.      Right Ear: Tympanic membrane, ear canal and external ear normal.      Left Ear: Tympanic membrane, ear canal and external ear normal.      Nose: Congestion present.      Mouth/Throat:      Mouth: Mucous membranes are moist.      Pharynx: No oropharyngeal exudate or posterior oropharyngeal erythema.      Comments: PND  Eyes:      Extraocular Movements: Extraocular movements intact.      Conjunctiva/sclera: Conjunctivae normal.      Pupils: Pupils are equal, round, and reactive to light.   Cardiovascular:      Rate and Rhythm: Normal rate and regular rhythm.   Pulmonary:      Breath  sounds: Wheezing present.   Musculoskeletal:         General: Normal range of motion.      Cervical back: Normal range of motion.   Lymphadenopathy:      Cervical: No cervical adenopathy.   Skin:     General: Skin is warm.   Neurological:      General: No focal deficit present.      Mental Status: He is alert and oriented to person, place, and time.   Psychiatric:         Mood and Affect: Mood normal.         Behavior: Behavior normal.             ED Course   Labs Reviewed - No data to display         XR CHEST PA + LAT CHEST (CPT=71046)    Result Date: 1/3/2025  PROCEDURE:  XR CHEST PA + LAT CHEST (CPT=71046)  INDICATIONS:  congestion  COMPARISON:  [facility], XR, XR CHEST PA + LAT CHEST (CPT=71046), 12/29/2024, 9:50 AM.  TECHNIQUE:  PA and lateral chest radiographs were obtained.  PATIENT STATED HISTORY: (As transcribed by Technologist)  Cough and congestion. Previous cxr 12-29-24.    FINDINGS:  Tortuous ectatic aorta present.  Cardiac shadow is enlarged..  The lungs are clear of active--appearing disease process.  The costophrenic angles are sharp.  There is no active disease seen.            CONCLUSION:  Tortuous ectatic aorta.  Cardiac shadow is enlarged.  No acute disease seen.    LOCATION:  [facility]   Dictated by (CST): Isiah Demarco MD on 1/03/2025 at 1:38 PM     Finalized by (CST): Isiah Demarco MD on 1/03/2025 at 1:38 PM       XR CHEST PA + LAT CHEST (CPT=71046)    Result Date: 12/29/2024  PROCEDURE:  XR CHEST PA + LAT CHEST (CPT=71046)  INDICATIONS:  Cold type symptoms  COMPARISON:  None.  TECHNIQUE:  PA and lateral chest radiographs were obtained.  PATIENT STATED HISTORY: (As transcribed by Technologist)  Pt. c/o cough and congestion. Hx of athma.               CONCLUSION:  Normal cardiac and mediastinal contours.  No pulmonary edema or focal airspace consolidation.  The pleural spaces are clear.   LOCATION:  [facility]   Dictated by (CST): Rosa Mancilla MD on 12/29/2024 at 10:18 AM     Finalized by  (CST): Rosa Mancilla MD on 12/29/2024 at 10:19 AM             MDM   ddx- viral uri, allergic rhiniitis, COVID, sinusitis, bronchitis, cap, asthma exacerbation.     on exam the patient is afebrile and nontoxic . Vitals signs are stable . nasal turbinates are erythematous and boggy, sinus tenderness. TMs are unremarkable . posterior pharynx shows PND .  Expiratory's wheezing is noted in all lung fields.  Patient was given a DuoNeb with some improvement of symptoms.  Sats remain 98% on room air denies any chest pain or shortness of breath.  Repeat chest x-ray was performed showing no acute findings.  Rest of the exam is unremarkable. Due to exam and history exam consistent with sinusitis with asthma exacerbation.  Started on doxycycline along with a longer taper prednisone and Tessalon Perles.. discussed at lengths with the patient at home care strict return precuaitons and importance of close follow up. All questions were answered and patient is comfortable with DC home        Medical Decision Making  Problems Addressed:  Acute cough: acute illness or injury  Sinobronchitis: acute illness or injury    Amount and/or Complexity of Data Reviewed  Radiology: ordered and independent interpretation performed. Decision-making details documented in ED Course.    Risk  OTC drugs.  Prescription drug management.        Disposition and Plan     Clinical Impression:  1. Sinobronchitis    2. Acute cough         Disposition:  Discharge  1/3/2025  1:54 pm    Follow-up:  No follow-up provider specified.        Medications Prescribed:  Discharge Medication List as of 1/3/2025  1:58 PM        START taking these medications    Details   !! fluticasone propionate 50 MCG/ACT Nasal Suspension 2 sprays by Nasal route daily., Normal, Disp-16 g, R-0      doxycycline 100 MG Oral Cap Take 1 capsule (100 mg total) by mouth 2 (two) times daily for 7 days., Normal, Disp-14 capsule, R-0      benzonatate 200 MG Oral Cap Take 1 capsule (200 mg total) by  mouth 3 (three) times daily as needed., Normal, Disp-20 capsule, R-0       !! - Potential duplicate medications found. Please discuss with provider.              Supplementary Documentation:

## 2025-01-03 NOTE — DISCHARGE INSTRUCTIONS
Continue use your albuterol inhaler every 4-6 hours  Take doxycycline twice a day until gone  Use Flonase as we discussed  Take an additional 5 more days of steroids  Close follow-up with your primary care doctor  Return to ER symptoms worsen

## 2025-01-13 ENCOUNTER — HOSPITAL ENCOUNTER (OUTPATIENT)
Facility: HOSPITAL | Age: 65
Setting detail: HOSPITAL OUTPATIENT SURGERY
Discharge: HOME | End: 2025-01-13
Attending: STUDENT IN AN ORGANIZED HEALTH CARE EDUCATION/TRAINING PROGRAM | Admitting: STUDENT IN AN ORGANIZED HEALTH CARE EDUCATION/TRAINING PROGRAM
Payer: COMMERCIAL

## 2025-01-13 ENCOUNTER — ANESTHESIA EVENT (OUTPATIENT)
Dept: SURGERY | Facility: HOSPITAL | Age: 65
End: 2025-01-13
Payer: COMMERCIAL

## 2025-01-13 ENCOUNTER — ANESTHESIA (OUTPATIENT)
Dept: SURGERY | Facility: HOSPITAL | Age: 65
End: 2025-01-13
Payer: COMMERCIAL

## 2025-01-13 VITALS
HEART RATE: 64 BPM | HEIGHT: 74 IN | DIASTOLIC BLOOD PRESSURE: 74 MMHG | TEMPERATURE: 97 F | RESPIRATION RATE: 16 BRPM | SYSTOLIC BLOOD PRESSURE: 133 MMHG | WEIGHT: 315 LBS | OXYGEN SATURATION: 99 % | BODY MASS INDEX: 40.43 KG/M2

## 2025-01-13 DIAGNOSIS — G89.18 POST-OPERATIVE PAIN: ICD-10-CM

## 2025-01-13 DIAGNOSIS — R22.1 NECK MASS: ICD-10-CM

## 2025-01-13 DIAGNOSIS — Z01.818 PRE-OP TESTING: Primary | ICD-10-CM

## 2025-01-13 LAB
GLUCOSE BLD-MCNC: 234 MG/DL (ref 70–99)
GLUCOSE BLD-MCNC: 285 MG/DL (ref 70–99)

## 2025-01-13 PROCEDURE — 82962 GLUCOSE BLOOD TEST: CPT

## 2025-01-13 PROCEDURE — 0JB40ZZ EXCISION OF RIGHT NECK SUBCUTANEOUS TISSUE AND FASCIA, OPEN APPROACH: ICD-10-PCS | Performed by: STUDENT IN AN ORGANIZED HEALTH CARE EDUCATION/TRAINING PROGRAM

## 2025-01-13 PROCEDURE — 88304 TISSUE EXAM BY PATHOLOGIST: CPT | Performed by: STUDENT IN AN ORGANIZED HEALTH CARE EDUCATION/TRAINING PROGRAM

## 2025-01-13 RX ORDER — LIDOCAINE HYDROCHLORIDE 10 MG/ML
INJECTION, SOLUTION EPIDURAL; INFILTRATION; INTRACAUDAL; PERINEURAL AS NEEDED
Status: DISCONTINUED | OUTPATIENT
Start: 2025-01-13 | End: 2025-01-13 | Stop reason: SURG

## 2025-01-13 RX ORDER — INSULIN ASPART 100 [IU]/ML
INJECTION, SOLUTION INTRAVENOUS; SUBCUTANEOUS ONCE
Status: COMPLETED | OUTPATIENT
Start: 2025-01-13 | End: 2025-01-13

## 2025-01-13 RX ORDER — ONDANSETRON 2 MG/ML
INJECTION INTRAMUSCULAR; INTRAVENOUS AS NEEDED
Status: DISCONTINUED | OUTPATIENT
Start: 2025-01-13 | End: 2025-01-13 | Stop reason: SURG

## 2025-01-13 RX ORDER — SCOPOLAMINE 1 MG/3D
1 PATCH, EXTENDED RELEASE TRANSDERMAL ONCE
Status: DISCONTINUED | OUTPATIENT
Start: 2025-01-13 | End: 2025-01-13 | Stop reason: HOSPADM

## 2025-01-13 RX ORDER — ACETAMINOPHEN 500 MG
1000 TABLET ORAL ONCE
Status: DISCONTINUED | OUTPATIENT
Start: 2025-01-13 | End: 2025-01-13 | Stop reason: HOSPADM

## 2025-01-13 RX ORDER — NICOTINE POLACRILEX 4 MG
15 LOZENGE BUCCAL
Status: DISCONTINUED | OUTPATIENT
Start: 2025-01-13 | End: 2025-01-13 | Stop reason: HOSPADM

## 2025-01-13 RX ORDER — SODIUM CHLORIDE, SODIUM LACTATE, POTASSIUM CHLORIDE, CALCIUM CHLORIDE 600; 310; 30; 20 MG/100ML; MG/100ML; MG/100ML; MG/100ML
INJECTION, SOLUTION INTRAVENOUS CONTINUOUS
Status: DISCONTINUED | OUTPATIENT
Start: 2025-01-13 | End: 2025-01-13

## 2025-01-13 RX ORDER — KETOROLAC TROMETHAMINE 30 MG/ML
INJECTION, SOLUTION INTRAMUSCULAR; INTRAVENOUS AS NEEDED
Status: DISCONTINUED | OUTPATIENT
Start: 2025-01-13 | End: 2025-01-13 | Stop reason: SURG

## 2025-01-13 RX ORDER — DEXTROSE MONOHYDRATE 25 G/50ML
50 INJECTION, SOLUTION INTRAVENOUS
Status: DISCONTINUED | OUTPATIENT
Start: 2025-01-13 | End: 2025-01-13 | Stop reason: HOSPADM

## 2025-01-13 RX ORDER — HEPARIN SODIUM 5000 [USP'U]/ML
5000 INJECTION, SOLUTION INTRAVENOUS; SUBCUTANEOUS ONCE
Status: COMPLETED | OUTPATIENT
Start: 2025-01-13 | End: 2025-01-13

## 2025-01-13 RX ORDER — NALOXONE HYDROCHLORIDE 0.4 MG/ML
0.08 INJECTION, SOLUTION INTRAMUSCULAR; INTRAVENOUS; SUBCUTANEOUS AS NEEDED
Status: DISCONTINUED | OUTPATIENT
Start: 2025-01-13 | End: 2025-01-13

## 2025-01-13 RX ORDER — HYDROMORPHONE HYDROCHLORIDE 1 MG/ML
0.4 INJECTION, SOLUTION INTRAMUSCULAR; INTRAVENOUS; SUBCUTANEOUS EVERY 5 MIN PRN
Status: DISCONTINUED | OUTPATIENT
Start: 2025-01-13 | End: 2025-01-13

## 2025-01-13 RX ORDER — OXYCODONE HYDROCHLORIDE 5 MG/1
5 TABLET ORAL EVERY 6 HOURS PRN
Qty: 5 TABLET | Refills: 0 | Status: SHIPPED | OUTPATIENT
Start: 2025-01-13

## 2025-01-13 RX ORDER — DEXTROSE MONOHYDRATE 50 MG/ML
INJECTION, SOLUTION INTRAVENOUS CONTINUOUS
Status: DISCONTINUED | OUTPATIENT
Start: 2025-01-13 | End: 2025-01-13

## 2025-01-13 RX ORDER — BUPIVACAINE HYDROCHLORIDE 2.5 MG/ML
INJECTION, SOLUTION EPIDURAL; INFILTRATION; INTRACAUDAL AS NEEDED
Status: DISCONTINUED | OUTPATIENT
Start: 2025-01-13 | End: 2025-01-13 | Stop reason: HOSPADM

## 2025-01-13 RX ORDER — ONDANSETRON 2 MG/ML
4 INJECTION INTRAMUSCULAR; INTRAVENOUS EVERY 6 HOURS PRN
Status: DISCONTINUED | OUTPATIENT
Start: 2025-01-13 | End: 2025-01-13

## 2025-01-13 RX ORDER — INSULIN ASPART 100 [IU]/ML
INJECTION, SOLUTION INTRAVENOUS; SUBCUTANEOUS
Status: COMPLETED
Start: 2025-01-13 | End: 2025-01-13

## 2025-01-13 RX ORDER — HYDROMORPHONE HYDROCHLORIDE 1 MG/ML
0.2 INJECTION, SOLUTION INTRAMUSCULAR; INTRAVENOUS; SUBCUTANEOUS EVERY 5 MIN PRN
Status: DISCONTINUED | OUTPATIENT
Start: 2025-01-13 | End: 2025-01-13

## 2025-01-13 RX ORDER — PROCHLORPERAZINE EDISYLATE 5 MG/ML
5 INJECTION INTRAMUSCULAR; INTRAVENOUS EVERY 8 HOURS PRN
Status: DISCONTINUED | OUTPATIENT
Start: 2025-01-13 | End: 2025-01-13

## 2025-01-13 RX ORDER — DEXAMETHASONE SODIUM PHOSPHATE 4 MG/ML
VIAL (ML) INJECTION AS NEEDED
Status: DISCONTINUED | OUTPATIENT
Start: 2025-01-13 | End: 2025-01-13 | Stop reason: SURG

## 2025-01-13 RX ORDER — NICOTINE POLACRILEX 4 MG
30 LOZENGE BUCCAL
Status: DISCONTINUED | OUTPATIENT
Start: 2025-01-13 | End: 2025-01-13 | Stop reason: HOSPADM

## 2025-01-13 RX ORDER — HYDROMORPHONE HYDROCHLORIDE 1 MG/ML
0.6 INJECTION, SOLUTION INTRAMUSCULAR; INTRAVENOUS; SUBCUTANEOUS EVERY 5 MIN PRN
Status: DISCONTINUED | OUTPATIENT
Start: 2025-01-13 | End: 2025-01-13

## 2025-01-13 RX ORDER — ROCURONIUM BROMIDE 10 MG/ML
INJECTION, SOLUTION INTRAVENOUS AS NEEDED
Status: DISCONTINUED | OUTPATIENT
Start: 2025-01-13 | End: 2025-01-13 | Stop reason: SURG

## 2025-01-13 RX ADMIN — ROCURONIUM BROMIDE 50 MG: 10 INJECTION, SOLUTION INTRAVENOUS at 10:04:00

## 2025-01-13 RX ADMIN — KETOROLAC TROMETHAMINE 30 MG: 30 INJECTION, SOLUTION INTRAMUSCULAR; INTRAVENOUS at 10:55:00

## 2025-01-13 RX ADMIN — ROCURONIUM BROMIDE 10 MG: 10 INJECTION, SOLUTION INTRAVENOUS at 10:31:00

## 2025-01-13 RX ADMIN — LIDOCAINE HYDROCHLORIDE 50 MG: 10 INJECTION, SOLUTION EPIDURAL; INFILTRATION; INTRACAUDAL; PERINEURAL at 10:04:00

## 2025-01-13 RX ADMIN — DEXAMETHASONE SODIUM PHOSPHATE 4 MG: 4 MG/ML VIAL (ML) INJECTION at 10:21:00

## 2025-01-13 RX ADMIN — ONDANSETRON 4 MG: 2 INJECTION INTRAMUSCULAR; INTRAVENOUS at 10:55:00

## 2025-01-13 NOTE — ANESTHESIA POSTPROCEDURE EVALUATION
[facility] Patient Status:  Hospital Outpatient Surgery   Age/Gender 64 year old male MRN [id number]   Location [facility] SURGERY Attending Nina Dash MD   Hosp Day # 0 PCP Michael Ashton MD       Anesthesia Post-op Note    EXCISION RIGHT POSTERIOR NECK MASS    Procedure Summary       Date: 01/13/25 Room / Location:  MAIN OR 05 / [facility] MAIN OR    Anesthesia Start: 0957 Anesthesia Stop: 1124    Procedure: EXCISION RIGHT POSTERIOR NECK MASS (Right: Neck) Diagnosis:       Neck mass      (Neck mass [R22.1])    Surgeons: Nina Dash MD Anesthesiologist: Berlin Marrufo MD    Anesthesia Type: general ASA Status: 2            Anesthesia Type: general    Vitals Value Taken Time   /82 01/13/25 1124   Temp 97.5 01/13/25 1124   Pulse 72 01/13/25 1124   Resp 13 01/13/25 1124   SpO2 97 01/13/25 1124           Patient Location: PACU    Anesthesia Type: general    Airway Patency: patent    Postop Pain Control: adequate    Mental Status: preanesthetic baseline    Nausea/Vomiting: none    Cardiopulmonary/Hydration status: stable euvolemic    Complications: no apparent anesthesia related complications    Postop vital signs: stable    Dental Exam: Unchanged from Preop    Patient to be discharged from PACU when criteria met.

## 2025-01-13 NOTE — ANESTHESIA PREPROCEDURE EVALUATION
PRE-OP EVALUATION    Patient Name: Tye Sylvester    Admit Diagnosis: Neck mass [R22.1]    Pre-op Diagnosis: Neck mass [R22.1]    EXCISION RIGHT POSTERIOR NECK MASS    Anesthesia Procedure: EXCISION RIGHT POSTERIOR NECK MASS (Right)    Surgeons and Role:     * Nina Dash MD - Primary    Pre-op vitals reviewed.        Body mass index is 41.73 kg/m².    Current medications reviewed.  Hospital Medications:  No current facility-administered medications on file as of .       Outpatient Medications:   Prescriptions Prior to Admission[1]    Allergies: Cephalosporins and Penicillins      Anesthesia Evaluation        Anesthetic Complications  (+) history of anesthetic complications  History of: PONV       GI/Hepatic/Renal      (+) GERD and well controlled                          Cardiovascular        Exercise tolerance: good     MET: >4      (+) hypertension   (+) hyperlipidemia                                  Endo/Other      (+) diabetes  type 2, using insulin                         Pulmonary      (+) asthma              (+) sleep apnea and CPAP      Neuro/Psych    Negative neuro/psych ROS.                                  Past Surgical History:   Procedure Laterality Date    Colonoscopy  2008    Colonoscopy  01/11/2017    adenomatous polyp, diverticuli and int hem    Knee arthroscopy  1988    Other surgical history      deviated septum, nasal surgery    Other surgical history      arthroscopic knee surgery right    Stomach surgery procedure unlisted  1990     Social History     Socioeconomic History    Marital status:     Number of children: 2   Occupational History    Occupation: [employer]    Tobacco Use    Smoking status: Never    Smokeless tobacco: Never   Vaping Use    Vaping status: Never Used   Substance and Sexual Activity    Alcohol use: Yes     Alcohol/week: 2.0 standard drinks of alcohol     Types: 1 Glasses of wine, 1 Cans of beer per week     Comment: RARE    Drug use: Never     Sexual activity: Not Currently   Other Topics Concern    Caffeine Concern No    Exercise No    Seat Belt No    Special Diet No    Stress Concern No    Weight Concern Yes     History   Drug Use Unknown     Available pre-op labs reviewed.  Lab Results   Component Value Date    WBC 7.1 11/11/2024    RBC 4.82 11/11/2024    HGB 14.1 11/11/2024    HCT 42.8 11/11/2024    MCV 88.8 11/11/2024    MCH 29.3 11/11/2024    MCHC 32.9 11/11/2024    RDW 13.5 11/11/2024    .0 11/11/2024     Lab Results   Component Value Date     10/28/2024    K 4.7 10/28/2024     10/28/2024    CO2 29.0 10/28/2024    BUN 15 10/28/2024    CREATSERUM 0.79 10/28/2024     (H) 10/28/2024    CA 9.9 10/28/2024            Airway      Mallampati: I  Mouth opening: 3 FB  TM distance: 4 - 6 cm  Neck ROM: full Cardiovascular    Cardiovascular exam normal.         Dental    Dentition appears grossly intact         Pulmonary    Pulmonary exam normal.                 Other findings              ASA: 2   Plan: general  NPO status verified and patient meets guidelines.    Post-procedure pain management plan discussed with surgeon and patient.      Plan/risks discussed with: patient  Use of blood product(s) discussed with: patient              Present on Admission:  **None**             [1]   No medications prior to admission.

## 2025-01-13 NOTE — OPERATIVE REPORT
[facility]  Operative Note    Tye Sylvester Location: OR   CSN [id number] MRN [id number]    1960 Age 64 year old   Admission Date 2025 Operation Date 2025   Attending Physician Nina Dash MD Operating Physician Nina Dash MD   PCP Michael Ashton MD          Patient Name: Tye Sylvester    Preoperative Diagnosis: Neck mass [R22.1]    Postoperative Diagnosis: Same as pre-op diagnosis.    Primary Surgeon: Nina Dash MD    Assistant: Geeta Guerin PA-C    Anesthesia: General    Anesthesiologist: Anesthesiologist.: Berlin Marrufo MD    Procedures: EXCISION RIGHT POSTERIOR NECK MASS     Implants: * No implants in log *     Specimen: right posterior neck lipoma     Drains: none     Estimated Blood Loss: 5 mL     Complications: none    Condition: stable    Indications for Surgery:   Tye Sylvester is a 64 year old male who presented with a painful mass in the right posterior neck. A CT scan showed a 4.3 cm lipoma within the inferior posterior neck with a portion of it extending into the right trapezius muscle. The risks of lesion excision were discussed with the patient and include but are not limited to bleeding, hematoma, infection, incision separation, incomplete excision, recurrence, injury to surrounding structures, need for additional surgery. The patient voiced understanding and agreed to proceed.      Surgical Findings:   Lipoma with intra-muscular infiltration measuring approximately 3 cms   Incision measured 5.5 cms    Description of Procedure:   The patient was transported to the operating room and placed on the operating table in supine position.General endotracheal anesthesia was administered. Preoperative antibiotics were given. The patient was positioned in left lateral decubitus. The right posterior neck was prepped and draped in sterile fashion. A time-out was performed.    An incision was made atop the palpable mass and then extended through the  subcutaneous tissues with electrocautery. The lipoma was noted with intra-muscular extension into the right trapezius. The lipoma was dissected circumferentially from the surrounding tissue. The lipoma was removed in it's entirety and sent to pathology lab for evaluation. The remaining incision measured 5.5 cms. This was irrigated copiously and hemostasis was achieved. The wound was then closed in layers using a 2-0 V Loc for the deep layer and 3-0 V Loc for the deep dermal layer.     The skin incision was cleansed, irrigated, and injected with local anesthetic. Skin was reapproximated using 4-0 Monocryl.  Skin glue was used to seal the incisions.  The patient was awakened from anesthesia and brought to the recovery room in good condition. The patient tolerated the procedure well without apparent complication. All sponge, needle, and instrument counts were correct at the end of the case.    Nina Dash MD  1/13/2025  11:01 AM

## 2025-01-13 NOTE — DISCHARGE INSTRUCTIONS
Home Care Instructions        WHAT TO EXPECT  You may experience pain at the incisions at rest and with movement. You may have some mild bruising around the incision. If taking narcotic medications, you may experience constipation. If you have not had a bowel movement by the third day after surgery, take Miralax 15g (one cap full) every 4 hours until you have a bowel movement. If another 24 hours goes by without a bowel movement, then you can take a dose of magnesium citrate or milk of magnesia. You may experience sore throat. This is due to the breathing tube used during surgery. You may experience mild nausea and vomiting for the first 24 hours, this should resolve quickly.    MEDICATIONS  You can take Tylenol 1000mg (2 Extra Strength Tylenol) every 8 hours for pain. For the first 48 hours it is best to take the Tylenol every 8 hours even if you do not feel much pain. For moderate to severe post-operative pain control you will be prescribed Tramadol or Oxycodone. Take one pill (50mg) every six hours as needed for pain. If you do not feel that narcotics are necessary you shouldn’t take them. If the pain is severe the patient may take two pills every six hours, taking care not to exceed 8 pills in a 24 hour period. After 48 hours, you can also take Advil (ibuprofeh) 800mg every 8 hours or Alleve (naproxen) 500mg every 12 hours. Please ask your surgeon before resuming blood thinners such as aspirin, plavix or coumadin. All other home medications may be resumed as scheduled.     DIET  There are no diet restrictions. You may resume a general diet immediately, however it is best to start light and bland. This is not a good time to eat excessively.  Minimize high fat foods and dairy products in the immediate post-operative time period as this may cause some intestinal discomfort or loose stools. There should be no alcohol consumption in the immediate recover time period or within six hours of taking narcotics.    WOUND  CARE  Do not remove the skin glue that are adherent to the skin. This will eventually peel away from the skin and at that point they may be removed. This is usually seven to fourteen days after surgery. You may shower the day after surgery. There is no need to cover the incisions. Soap can get on the incisions. Do not scrub the incisions. No hair dye or chemicals of any kind should get on the incisions. Do not apply any neosporin, hydrogen peroxide, or other topical medications or ointments. Do not submerge the incisions under water (bathing, swimming) for two weeks. There is absorbable sutures under the skin that will dissolve over time.     ACTIVITY  Every day you should be up out of bed, showered and dressed. Do not stay in bed all day. You should be up and walking around the house frequently. It is normal to feel tired or fatigued for the first week after surgery. Walking helps to avoid pneumonia and blood clots in the legs. You can also go to the store or walk outside to get out of the house and to stay active. Do not drive within 8 hours of taking a narcotic medication (tramadol or Oxycodone). You must be able to react quickly to avoid a traffic accident without pain before you can drive. You may return to work once you no longer need narcotic pain medications during the day. Avoid strenuous upper body activity for at least a week.    APPOINTMENT  Please call our office as soon as possible for an appointment at [phone]. Please call our office immediately for fever greater than 100.5, extensive bleeding, inability to urinate.  For life threatening emergencies such as severe chest pain, shortness of breath, loss of conciousness call 911. For non-emergent care please call our office after 8:30 a.m. Monday through Friday. The number above directs to the answering service after hours to reach the on-call physician.    You received a drug called Toradol which is an Anti Inflammatory at: 11AM  If you are  allowed to take Anti inflammatories (like Motrin, Aleve or Ibuprofen, Advil), do not take for six hours after Toradol dose.  Please report any suspected allergic reactions or bleeding issues to your doctor

## 2025-01-15 ENCOUNTER — TELEPHONE (OUTPATIENT)
Dept: INTERNAL MEDICINE CLINIC | Facility: CLINIC | Age: 65
End: 2025-01-15

## 2025-01-15 NOTE — TELEPHONE ENCOUNTER
Approved    Prior authorization approved  Payer: [facility] Case ID: [id number]    [phone]    [phone]  Note from payer: Your PA request has been approved.  Additional information will be provided in the approval communication. (Message 1147)  Approval Details    Authorized from December 16, 2024 to January 15, 2026    Patient notified via [email or web address].

## 2025-01-23 ENCOUNTER — OFFICE VISIT (OUTPATIENT)
Facility: LOCATION | Age: 65
End: 2025-01-23
Payer: COMMERCIAL

## 2025-01-23 VITALS
BODY MASS INDEX: 40.43 KG/M2 | SYSTOLIC BLOOD PRESSURE: 136 MMHG | WEIGHT: 315 LBS | TEMPERATURE: 97 F | HEART RATE: 84 BPM | DIASTOLIC BLOOD PRESSURE: 82 MMHG | OXYGEN SATURATION: 96 % | HEIGHT: 74 IN

## 2025-01-23 DIAGNOSIS — Z98.890 POST-OPERATIVE STATE: Primary | ICD-10-CM

## 2025-01-23 PROCEDURE — 3008F BODY MASS INDEX DOCD: CPT | Performed by: STUDENT IN AN ORGANIZED HEALTH CARE EDUCATION/TRAINING PROGRAM

## 2025-01-23 PROCEDURE — 99024 POSTOP FOLLOW-UP VISIT: CPT | Performed by: STUDENT IN AN ORGANIZED HEALTH CARE EDUCATION/TRAINING PROGRAM

## 2025-01-23 PROCEDURE — 3075F SYST BP GE 130 - 139MM HG: CPT | Performed by: STUDENT IN AN ORGANIZED HEALTH CARE EDUCATION/TRAINING PROGRAM

## 2025-01-23 PROCEDURE — 3079F DIAST BP 80-89 MM HG: CPT | Performed by: STUDENT IN AN ORGANIZED HEALTH CARE EDUCATION/TRAINING PROGRAM

## 2025-01-23 NOTE — PROGRESS NOTES
Follow Up Visit Note       Active Problems      1. Post-operative state          Chief Complaint   Chief Complaint   Patient presents with    Post-Op     PO - Follow up on removed Lipoma, no symptoms.         History of Present Illness  64 year old male who underwent posterior neck lipoma excision on 1/13  He denies any symptoms of fever or chills. Minimal pain at the incision site. No drainage or any redness to the incision.     Allergies  Tye is allergic to cephalosporins and penicillins.    Past Medical / Surgical / Social / Family History    The past medical and past surgical history have been reviewed by me today.    Past Medical History:    Allergic rhinitis    Arthritis    Asthma (HCC)    Diabetes (HCC)    Diabetes mellitus (HCC)    Diverticulosis of intestine    Essential hypertension    High blood pressure    High cholesterol    History of colon polyps    History of stomach ulcers    Hyperlipidemia    Hypertension    Irregular bowel habits    Obesity    MADY (obstructive sleep apnea)    on CPAP 9cm    PONV (postoperative nausea and vomiting)    Sleep apnea    CPAP    Visual impairment    GLASSES    Wears glasses    Weight gain    After taking Type 2 Diabetes Medicines     Past Surgical History:   Procedure Laterality Date    Colonoscopy  2008    Colonoscopy  01/11/2017    adenomatous polyp, diverticuli and int hem    Knee arthroscopy  1988    Other surgical history      deviated septum, nasal surgery    Other surgical history      arthroscopic knee surgery right    Stomach surgery procedure unlisted  1990       The family history and social history have been reviewed by me today.    Family History   Problem Relation Age of Onset    Prostate Cancer Father     Diabetes Mother     Hypertension Mother      Social History     Socioeconomic History    Marital status:     Number of children: 2   Occupational History    Occupation: [employer]    Tobacco Use    Smoking status: Never     Smokeless tobacco: Never   Vaping Use    Vaping status: Never Used   Substance and Sexual Activity    Alcohol use: Yes     Alcohol/week: 2.0 standard drinks of alcohol     Types: 1 Glasses of wine, 1 Cans of beer per week     Comment: RARE    Drug use: Never    Sexual activity: Not Currently   Other Topics Concern    Caffeine Concern No    Exercise No    Seat Belt No    Special Diet No    Stress Concern No    Weight Concern Yes        Current Outpatient Medications:     fluticasone propionate 50 MCG/ACT Nasal Suspension, 2 sprays by Nasal route daily., Disp: 16 g, Rfl: 0    benzonatate 200 MG Oral Cap, Take 1 capsule (200 mg total) by mouth 3 (three) times daily as needed., Disp: 20 capsule, Rfl: 0    Continuous Glucose Sensor (FREESTYLE CELESTINE 3 SENSOR) Does not apply Misc, 1 each every 14 (fourteen) days. Apply sensor every 14 days, Disp: 6 each, Rfl: 3    semaglutide 4 MG/3ML Subcutaneous Solution Pen-injector, Inject 1 mg into the skin once a week. (Patient taking differently: Inject 1 mg into the skin once a week. ON SATURDAY), Disp: 9 mL, Rfl: 0    SITagliptin-metFORMIN HCl (JANUMET)  MG Oral Tab, Take 1 tablet by mouth 2 (two) times daily with meals., Disp: 180 tablet, Rfl: 3    Insulin Pen Needle (PEN NEEDLES) 32G X 4 MM Does not apply Misc, Inject 1 Needle into the skin at bedtime., Disp: 100 each, Rfl: 3    pioglitazone 30 MG Oral Tab, Take 1 tablet (30 mg total) by mouth daily., Disp: 90 tablet, Rfl: 3    rosuvastatin 10 MG Oral Tab, Take 1 tablet (10 mg total) by mouth nightly. (Patient taking differently: Take 1 tablet (10 mg total) by mouth daily.), Disp: 90 tablet, Rfl: 3    clarithromycin 500 MG Oral Tab, Take 1 tablet (500 mg total) by mouth 2 (two) times daily., Disp: 20 tablet, Rfl: 0    Insulin Glargine-yfgn 100 UNIT/ML Subcutaneous Solution Pen-injector, Inject 20 Units into the skin nightly., Disp: 21 mL, Rfl: 3    Fosinopril Sodium 20 MG Oral Tab, Take 1 tablet (20 mg total) by mouth  daily., Disp: 90 tablet, Rfl: 3    pantoprazole 20 MG Oral Tab EC, Take 1 tablet (20 mg total) by mouth before breakfast., Disp: 90 tablet, Rfl: 0    PREVIDENT 5000 DRY MOUTH 1.1 % Dental Gel, , Disp: , Rfl:     Fluticasone Propionate 50 MCG/ACT Nasal Suspension, Two sprays in each nostril once daily, Disp: 1 Bottle, Rfl: 6    olopatadine 0.1 % Ophthalmic Solution, Apply 1 drop to eye 2 (two) times daily., Disp: 5 mL, Rfl: 0    mupirocin 2 % External Ointment, Apply 1 Application topically 2 (two) times daily., Disp: 22 g, Rfl: 0    oxyCODONE 5 MG Oral Tab, Take 1 tablet (5 mg total) by mouth every 6 (six) hours as needed., Disp: 5 tablet, Rfl: 0     Review of Systems  The Review of Systems has been reviewed by me during today.  Review of Systems   Constitutional:  Negative for chills, diaphoresis, fatigue and fever.   HENT:  Negative for ear discharge, ear pain and sore throat.    Eyes:  Negative for pain and discharge.   Respiratory:  Negative for cough, chest tightness and shortness of breath.    Cardiovascular:  Negative for chest pain, palpitations and leg swelling.   Gastrointestinal:  Negative for abdominal distention, abdominal pain, blood in stool, constipation, diarrhea, nausea and vomiting.   Genitourinary:  Negative for dysuria, frequency, hematuria and urgency.   Skin:  Negative for color change, pallor and rash.   Neurological:  Negative for weakness, light-headedness, numbness and headaches.   Hematological:  Negative for adenopathy. Does not bruise/bleed easily.   Psychiatric/Behavioral:  Negative for agitation and confusion.         Physical Findings   /82 (BP Location: Right arm, Patient Position: Sitting, Cuff Size: large)   Pulse 84   Temp 97.3 °F (36.3 °C) (Temporal)   Ht 74\"   Wt (!) 318 lb (144.2 kg)   SpO2 96%   BMI 40.83 kg/m²   Physical Exam  Constitutional:       Appearance: Normal appearance.   HENT:      Head: Normocephalic and atraumatic.   Cardiovascular:      Pulses:  Normal pulses.   Pulmonary:      Effort: Pulmonary effort is normal.   Skin:     General: Skin is warm.      Capillary Refill: Capillary refill takes less than 2 seconds.             Comments: Right posterior neck incision clean dry and intact without erythema   Neurological:      Mental Status: He is alert and oriented to person, place, and time. Mental status is at baseline.              Assessment/Plan  1. Post-operative state        Tye Sylvester is a 64 year old male status post posterior neck lipoma excision on 1/13  Pathology reviewed with the patient which shows lipoma without signs of malignancy. Doing well without signs of infection or seroma. I have no further follow-up scheduled with this patient at this time.  This patient can see me on an as-needed basis.  This patient should return urgently for any problems or complications related to my surgical intervention.           No orders of the defined types were placed in this encounter.      Imaging & Referrals   None    Follow Up  Return if symptoms worsen or fail to improve.    Nina Dash MD

## 2025-02-21 ENCOUNTER — HOSPITAL ENCOUNTER (OUTPATIENT)
Age: 65
Discharge: HOME | End: 2025-02-21
Payer: COMMERCIAL

## 2025-02-21 VITALS
DIASTOLIC BLOOD PRESSURE: 88 MMHG | RESPIRATION RATE: 20 BRPM | SYSTOLIC BLOOD PRESSURE: 152 MMHG | OXYGEN SATURATION: 100 % | TEMPERATURE: 98 F | HEART RATE: 73 BPM

## 2025-02-21 DIAGNOSIS — L53.8 PERIORBITAL ERYTHEMA: Primary | ICD-10-CM

## 2025-02-21 PROCEDURE — 99214 OFFICE O/P EST MOD 30 MIN: CPT | Performed by: NURSE PRACTITIONER

## 2025-02-21 RX ORDER — MUPIROCIN 20 MG/G
1 OINTMENT TOPICAL 2 TIMES DAILY
Qty: 22 G | Refills: 0 | Status: SHIPPED | OUTPATIENT
Start: 2025-02-21

## 2025-02-21 RX ORDER — OLOPATADINE HYDROCHLORIDE 1 MG/ML
1 SOLUTION/ DROPS OPHTHALMIC 2 TIMES DAILY
Qty: 5 ML | Refills: 0 | Status: SHIPPED | OUTPATIENT
Start: 2025-02-21

## 2025-02-21 NOTE — ED PROVIDER NOTES
History     Chief Complaint   Patient presents with    Eye Problem       Subjective:   HPI    Tye Sylvester, 64 year old male with notable medical history of obesity, T2DM, HTN, HLD, asthma who presents with erythema under eyes, mild URI symptoms, clear tearing. Patient reports s/s started approx 1-week ago w/o relief from Visine. Denies any colored eye discharge, conjunctival / vision changes. Admit to less than optimal water intake.      Patient Active Problem List   Diagnosis    History of colon polyps    Allergic rhinitis    Sleep apnea, obstructive    Obesity, unspecified    Essential hypertension, benign    Mixed hyperlipidemia    Type 2 diabetes mellitus with microalbuminuria, without long-term current use of insulin (HCC)    Sinusitis, chronic    History of adenomatous polyp of colon    Abscess of anal and rectal regions    Anal fistula    Asthma without status asthmaticus (HCC)    Dysphagia, unspecified    Gastric ulcer, acute with hemorrhage      Objective:   Past Medical History:    Allergic rhinitis    Arthritis    Asthma (HCC)    Diabetes (HCC)    Diabetes mellitus (HCC)    Diverticulosis of intestine    Essential hypertension    High blood pressure    High cholesterol    History of colon polyps    History of stomach ulcers    Hyperlipidemia    Hypertension    Irregular bowel habits    Obesity    MADY (obstructive sleep apnea)    on CPAP 9cm    PONV (postoperative nausea and vomiting)    Sleep apnea    CPAP    Visual impairment    GLASSES    Wears glasses    Weight gain    After taking Type 2 Diabetes Medicines              Past Surgical History:   Procedure Laterality Date    Colonoscopy  2008    Colonoscopy  01/11/2017    adenomatous polyp, diverticuli and int hem    Knee arthroscopy  1988    Other surgical history      deviated septum, nasal surgery    Other surgical history      arthroscopic knee surgery right    Stomach surgery procedure unlisted  1990                Social History      Socioeconomic History    Marital status:     Number of children: 2   Occupational History    Occupation: [employer]    Tobacco Use    Smoking status: Never    Smokeless tobacco: Never   Vaping Use    Vaping status: Never Used   Substance and Sexual Activity    Alcohol use: Yes     Alcohol/week: 2.0 standard drinks of alcohol     Types: 1 Glasses of wine, 1 Cans of beer per week     Comment: RARE    Drug use: Never    Sexual activity: Not Currently   Other Topics Concern    Caffeine Concern No    Exercise No    Seat Belt No    Special Diet No    Stress Concern No    Weight Concern Yes              Medications Ordered Prior to Encounter[1]      Constitutional and vital signs reviewed.      All other systems reviewed and negative except as noted above.    I have reviewed the family history, social history, allergies, and outpatient medications.     History reviewed from EMR: Encounters, problem list, allergies, medications      Physical Exam     ED Triage Vitals [02/21/25 1404]   /88   Pulse 73   Resp 20   Temp 97.9 °F (36.6 °C)   Temp src Oral   SpO2 100 %   O2 Device None (Room air)       Current:/88   Pulse 73   Temp 97.9 °F (36.6 °C) (Oral)   Resp 20   SpO2 100%       Physical Exam  Vitals and nursing note reviewed.   Constitutional:       General: He is not in acute distress.     Appearance: Normal appearance. He is obese. He is not ill-appearing or toxic-appearing.   HENT:      Head: Normocephalic and atraumatic.      Comments: Mild erythema to underneath both eyes w/o swelling or induration.      Right Ear: External ear normal.      Left Ear: External ear normal.      Nose: Nose normal. No congestion or rhinorrhea.      Mouth/Throat:      Mouth: Mucous membranes are moist.   Eyes:      Extraocular Movements: Extraocular movements intact.      Conjunctiva/sclera: Conjunctivae normal.      Right eye: Right conjunctiva is not injected. No chemosis, exudate or hemorrhage.      Left eye: Left conjunctiva is not injected. No chemosis, exudate or hemorrhage.     Pupils: Pupils are equal, round, and reactive to light.   Cardiovascular:      Rate and Rhythm: Normal rate.      Pulses: Normal pulses.   Pulmonary:      Effort: Pulmonary effort is normal. No respiratory distress.   Musculoskeletal:         General: No swelling, tenderness or signs of injury. Normal range of motion.      Cervical back: Normal range of motion.   Skin:     General: Skin is warm and dry.      Capillary Refill: Capillary refill takes less than 2 seconds.   Neurological:      General: No focal deficit present.      Mental Status: He is alert and oriented to person, place, and time. Mental status is at baseline.   Psychiatric:         Mood and Affect: Mood normal.         Behavior: Behavior normal.         Thought Content: Thought content normal.         Judgment: Judgment normal.            ED Course     Labs Reviewed - No data to display  No orders to display       Vitals:    02/21/25 1404   BP: 152/88   Pulse: 73   Resp: 20   Temp: 97.9 °F (36.6 °C)   TempSrc: Oral   SpO2: 100%            [facility]        Tye Sylvester, 64 year old male with medical history as noted above who presents with erythema underneath eyes   - Patient in NAD, VSS   - Skin dehydration / irritation (cold weather) vs preseptal / orbital cellulitis vs blepharitis vs viral / allergic conjunctivitis vs other   - Exam not c/w preseptal or orbital cellulitis   - No bacterial conjunctival or lid / lash infection   - No extension to cheeks (e.g. Lupus rash)       ** See ED course below for additional information on care provided / interventions / notable events throughout patient's encounter.    ** See Home Care Instructions below for care measures to trial as applicable.         ** I have independently reviewed the radiology images, clinical lab results, and ECG tracings as described above (if applicable)    ** Concerning co-morbidities possibly  affecting complaint / care: T2DM    ** See disposition & plan section below for home care instructions - if applicable        Medical Decision Making  Risk  OTC drugs.  Prescription drug management.        Disposition and Plan     Disposition:  Discharge  2/21/2025  2:23 pm    Clinical Impression:  1. Periorbital erythema            Home care instructions:     - Start taking Zyrtec (cetirizine) daily   - Use eye drops as directed while having symptoms   - DO NOT USE VISINE   - Apply thin layer of antibiotic ointment twice daily for 1-week   - Avoid cold exposure to face   - Increase water intake    General Health Maintenance   - Overall goal: Improve body functioning through improved cellular health by decrease inflammation   - Move more (7k steps a day improves cardiovascular health - approx 1.5hrs of cumulative walking per day)   - Sleep better (consistent bedtime, sleep mask, magnesium glycinate)   - Eat \"real\" food (avoid high processed foods such as: snack foods, margarine, frozen foods, most breads)   - Avoid sugar which is very inflammatory and interferes with cellular function   - Avoid excessive dairy (milk, cheese, etc. [Yogurt is ok])   - Early exposure to daylight   - Drink more water (preferably filtered)   - Prioritize protein with every meal   - Supplement nutrient dietary deficiencies with multivitamins, extra vitamin D (2000IU), Magnesium glycinate, and probiotics daily, year round   - Yearly health monitoring by your primary care provider     *individual modifications may be required      Follow-up:  Michael Ashton MD  [address] [place] [id number]  [phone]      As needed          Medications Prescribed:  Discharge Medication List as of 2/21/2025  2:28 PM        START taking these medications    Details   olopatadine 0.1 % Ophthalmic Solution Apply 1 drop to eye 2 (two) times daily., Normal, Disp-5 mL, R-0      mupirocin 2 % External Ointment Apply 1 Application topically 2  (two) times daily., Normal, Disp-22 g, R-0OK to substitute volume based on availability               Herbert Rollins, DNP, APRN, AGACNP-BC, FNP-C, CNL  Adult-Gerontology Acute Care & Family Nurse Practitioner  [facility]      The above patient (and/or guardian) was made aware that an appropriate evaluation has been performed, and that no additional testing is required at this time. In my medical judgment, there is currently no evidence of an immediate life-threatening or surgical condition, therefore discharge is indicated at this time. The patient (and/or guardian) was advised that a small risk still exists that a serious condition could develop. The patient was instructed to arrange close follow-up with their primary care provider (or the referral provider given today). The patient received written and verbal instructions regarding their condition / concerns, demonstrated understanding, and is agreement with the outpatient treatment plan.              [1]   Current Facility-Administered Medications on File Prior to Encounter   Medication Dose Route Frequency Provider Last Rate Last Admin    [COMPLETED] heparin (Porcine) 5000 UNIT/ML injection 5,000 Units  5,000 Units Subcutaneous Once Nina Dash MD   5,000 Units at 01/13/25 0912    [COMPLETED] vancomycin (Vancocin) 2,250 mg in sodium chloride 0.9% 500 mL IVPB  15 mg/kg Intravenous Once Nina Dash MD   2,250 mg at 01/13/25 1021    And    [COMPLETED] gentamicin (Garamycin) 540 mg in sodium chloride 0.9% 100 mL IVPB  5 mg/kg (Adjusted) Intravenous Once Nina Dash MD   540 mg at 01/13/25 1021    [COMPLETED] insulin aspart (NovoLOG) 100 Units/mL vial 1-5 Units  1-5 Units Subcutaneous Once Berlin Marrufo MD   4 Units at 01/13/25 0925    [COMPLETED] insulin aspart (NovoLOG) 100 Units/mL vial 1-5 Units  1-5 Units Subcutaneous Once Berlin Marrufo MD   3 Units at 01/13/25 1136    [COMPLETED] ipratropium-albuterol (Duoneb) 0.5-2.5 (3)  MG/3ML inhalation solution 3 mL  3 mL Nebulization Once Gillian Benedict PA   3 mL at 25 1342    [COMPLETED] ipratropium-albuterol (Duoneb) 0.5-2.5 (3) MG/3ML inhalation solution 3 mL  3 mL Nebulization Once Lenore Yan PA-C   3 mL at 24 0952    [COMPLETED] predniSONE (Deltasone) tab 60 mg  60 mg Oral Once Lenore Yan PA-C   60 mg at 24 0952     Current Outpatient Medications on File Prior to Encounter   Medication Sig Dispense Refill    oxyCODONE 5 MG Oral Tab Take 1 tablet (5 mg total) by mouth every 6 (six) hours as needed. 5 tablet 0    fluticasone propionate 50 MCG/ACT Nasal Suspension 2 sprays by Nasal route daily. 16 g 0    benzonatate 200 MG Oral Cap Take 1 capsule (200 mg total) by mouth 3 (three) times daily as needed. 20 capsule 0    [] predniSONE 20 MG Oral Tab Take 2 tablets (40 mg total) by mouth daily for 3 days, THEN 1 tablet (20 mg total) daily for 3 days, THEN 0.5 tablets (10 mg total) daily for 4 days. 11 tablet 0    [] albuterol 108 (90 Base) MCG/ACT Inhalation Aero Soln Inhale 2 puffs into the lungs every 4 (four) hours as needed for Wheezing. 1 each 0    Continuous Glucose Sensor (FREESTYLE CELESTINE 3 SENSOR) Does not apply Misc 1 each every 14 (fourteen) days. Apply sensor every 14 days 6 each 3    semaglutide 4 MG/3ML Subcutaneous Solution Pen-injector Inject 1 mg into the skin once a week. (Patient taking differently: Inject 1 mg into the skin once a week. ON SATURDAY) 9 mL 0    SITagliptin-metFORMIN HCl (JANUMET)  MG Oral Tab Take 1 tablet by mouth 2 (two) times daily with meals. 180 tablet 3    Insulin Pen Needle (PEN NEEDLES) 32G X 4 MM Does not apply Misc Inject 1 Needle into the skin at bedtime. 100 each 3    pioglitazone 30 MG Oral Tab Take 1 tablet (30 mg total) by mouth daily. 90 tablet 3    rosuvastatin 10 MG Oral Tab Take 1 tablet (10 mg total) by mouth nightly. (Patient taking differently: Take 1 tablet (10 mg total) by mouth daily.)  90 tablet 3    clarithromycin 500 MG Oral Tab Take 1 tablet (500 mg total) by mouth 2 (two) times daily. 20 tablet 0    Insulin Glargine-yfgn 100 UNIT/ML Subcutaneous Solution Pen-injector Inject 20 Units into the skin nightly. 21 mL 3    Fosinopril Sodium 20 MG Oral Tab Take 1 tablet (20 mg total) by mouth daily. 90 tablet 3    pantoprazole 20 MG Oral Tab EC Take 1 tablet (20 mg total) by mouth before breakfast. 90 tablet 0    PREVIDENT 5000 DRY MOUTH 1.1 % Dental Gel       Fluticasone Propionate 50 MCG/ACT Nasal Suspension Two sprays in each nostril once daily 1 Bottle 6

## 2025-02-21 NOTE — DISCHARGE INSTRUCTIONS
- Start taking Zyrtec (cetirizine) daily   - Use eye drops as directed while having symptoms   - DO NOT USE VISINE   - Apply thin layer of antibiotic ointment twice daily for 1-week   - Avoid cold exposure to face   - Increase water intake    General Health Maintenance   - Overall goal: Improve body functioning through improved cellular health by decrease inflammation   - Move more (7k steps a day improves cardiovascular health - approx 1.5hrs of cumulative walking per day)   - Sleep better (consistent bedtime, sleep mask, magnesium glycinate)   - Eat \"real\" food (avoid high processed foods such as: snack foods, margarine, frozen foods, most breads)   - Avoid sugar which is very inflammatory and interferes with cellular function   - Avoid excessive dairy (milk, cheese, etc. [Yogurt is ok])   - Early exposure to daylight   - Drink more water (preferably filtered)   - Prioritize protein with every meal   - Supplement nutrient dietary deficiencies with multivitamins, extra vitamin D (2000IU), Magnesium glycinate, and probiotics daily, year round   - Yearly health monitoring by your primary care provider     *individual modifications may be required

## 2025-03-14 ENCOUNTER — LAB ENCOUNTER (OUTPATIENT)
Dept: LAB | Age: 65
End: 2025-03-14
Attending: FAMILY MEDICINE
Payer: COMMERCIAL

## 2025-03-14 ENCOUNTER — OFFICE VISIT (OUTPATIENT)
Dept: INTERNAL MEDICINE CLINIC | Facility: CLINIC | Age: 65
End: 2025-03-14
Payer: COMMERCIAL

## 2025-03-14 VITALS
OXYGEN SATURATION: 98 % | SYSTOLIC BLOOD PRESSURE: 132 MMHG | WEIGHT: 315 LBS | HEART RATE: 76 BPM | TEMPERATURE: 97 F | BODY MASS INDEX: 40 KG/M2 | DIASTOLIC BLOOD PRESSURE: 78 MMHG

## 2025-03-14 DIAGNOSIS — R14.0 ABDOMINAL BLOATING: ICD-10-CM

## 2025-03-14 DIAGNOSIS — R53.83 OTHER FATIGUE: ICD-10-CM

## 2025-03-14 DIAGNOSIS — R80.9 TYPE 2 DIABETES MELLITUS WITH MICROALBUMINURIA, WITHOUT LONG-TERM CURRENT USE OF INSULIN (HCC): ICD-10-CM

## 2025-03-14 DIAGNOSIS — R19.7 DIARRHEA, UNSPECIFIED TYPE: ICD-10-CM

## 2025-03-14 DIAGNOSIS — Z01.84 IMMUNITY STATUS TESTING: ICD-10-CM

## 2025-03-14 DIAGNOSIS — E11.29 TYPE 2 DIABETES MELLITUS WITH MICROALBUMINURIA, WITHOUT LONG-TERM CURRENT USE OF INSULIN (HCC): ICD-10-CM

## 2025-03-14 DIAGNOSIS — R14.0 ABDOMINAL BLOATING: Primary | ICD-10-CM

## 2025-03-14 DIAGNOSIS — G47.33 OSA ON CPAP: ICD-10-CM

## 2025-03-14 LAB
ALBUMIN SERPL-MCNC: 4.7 G/DL (ref 3.2–4.8)
ALBUMIN/GLOB SERPL: 2 {RATIO} (ref 1–2)
ALP LIVER SERPL-CCNC: 77 U/L
ALT SERPL-CCNC: 25 U/L
ANION GAP SERPL CALC-SCNC: 9 MMOL/L (ref 0–18)
AST SERPL-CCNC: 22 U/L (ref ?–34)
BASOPHILS # BLD AUTO: 0.1 X10(3) UL (ref 0–0.2)
BASOPHILS NFR BLD AUTO: 1.4 %
BILIRUB SERPL-MCNC: 0.5 MG/DL (ref 0.2–1.1)
BUN BLD-MCNC: 16 MG/DL (ref 9–23)
CALCIUM BLD-MCNC: 9.6 MG/DL (ref 8.7–10.6)
CHLORIDE SERPL-SCNC: 105 MMOL/L (ref 98–112)
CHOLEST SERPL-MCNC: 106 MG/DL (ref ?–200)
CO2 SERPL-SCNC: 26 MMOL/L (ref 21–32)
CREAT BLD-MCNC: 0.9 MG/DL
CREAT UR-SCNC: 189.1 MG/DL
EGFRCR SERPLBLD CKD-EPI 2021: 95 ML/MIN/1.73M2 (ref 60–?)
EOSINOPHIL # BLD AUTO: 0.32 X10(3) UL (ref 0–0.7)
EOSINOPHIL NFR BLD AUTO: 4.4 %
ERYTHROCYTE [DISTWIDTH] IN BLOOD BY AUTOMATED COUNT: 13.9 %
EST. AVERAGE GLUCOSE BLD GHB EST-MCNC: 220 MG/DL (ref 68–126)
FASTING PATIENT LIPID ANSWER: YES
FASTING STATUS PATIENT QL REPORTED: YES
GLOBULIN PLAS-MCNC: 2.3 G/DL (ref 2–3.5)
GLUCOSE BLD-MCNC: 165 MG/DL (ref 70–99)
HBA1C MFR BLD: 9.3 % (ref ?–5.7)
HCT VFR BLD AUTO: 43.2 %
HDLC SERPL-MCNC: 37 MG/DL (ref 40–59)
HGB BLD-MCNC: 13.7 G/DL
IMM GRANULOCYTES # BLD AUTO: 0.03 X10(3) UL (ref 0–1)
IMM GRANULOCYTES NFR BLD: 0.4 %
LDLC SERPL CALC-MCNC: 49 MG/DL (ref ?–100)
LYMPHOCYTES # BLD AUTO: 1.28 X10(3) UL (ref 1–4)
LYMPHOCYTES NFR BLD AUTO: 17.5 %
MCH RBC QN AUTO: 28.6 PG (ref 26–34)
MCHC RBC AUTO-ENTMCNC: 31.7 G/DL (ref 31–37)
MCV RBC AUTO: 90.2 FL
MICROALBUMIN UR-MCNC: 9.9 MG/DL
MICROALBUMIN/CREAT 24H UR-RTO: 52.4 UG/MG (ref ?–30)
MONOCYTES # BLD AUTO: 0.74 X10(3) UL (ref 0.1–1)
MONOCYTES NFR BLD AUTO: 10.1 %
NEUTROPHILS # BLD AUTO: 4.83 X10 (3) UL (ref 1.5–7.7)
NEUTROPHILS # BLD AUTO: 4.83 X10(3) UL (ref 1.5–7.7)
NEUTROPHILS NFR BLD AUTO: 66.2 %
NONHDLC SERPL-MCNC: 69 MG/DL (ref ?–130)
OSMOLALITY SERPL CALC.SUM OF ELEC: 295 MOSM/KG (ref 275–295)
PLATELET # BLD AUTO: 271 10(3)UL (ref 150–450)
POTASSIUM SERPL-SCNC: 4.5 MMOL/L (ref 3.5–5.1)
PROT SERPL-MCNC: 7 G/DL (ref 5.7–8.2)
RBC # BLD AUTO: 4.79 X10(6)UL
RUBV IGG SER QL: POSITIVE
RUBV IGG SER-ACNC: 204 IU/ML (ref 10–?)
SODIUM SERPL-SCNC: 140 MMOL/L (ref 136–145)
TRIGL SERPL-MCNC: 108 MG/DL (ref 30–149)
TSI SER-ACNC: 2 UIU/ML (ref 0.55–4.78)
VIT B12 SERPL-MCNC: 270 PG/ML (ref 211–911)
VIT D+METAB SERPL-MCNC: 14.5 NG/ML (ref 30–100)
VLDLC SERPL CALC-MCNC: 15 MG/DL (ref 0–30)
WBC # BLD AUTO: 7.3 X10(3) UL (ref 4–11)

## 2025-03-14 PROCEDURE — 87046 STOOL CULTR AEROBIC BACT EA: CPT

## 2025-03-14 PROCEDURE — 86762 RUBELLA ANTIBODY: CPT

## 2025-03-14 PROCEDURE — 82607 VITAMIN B-12: CPT

## 2025-03-14 PROCEDURE — 87427 SHIGA-LIKE TOXIN AG IA: CPT

## 2025-03-14 PROCEDURE — 80053 COMPREHEN METABOLIC PANEL: CPT

## 2025-03-14 PROCEDURE — 86735 MUMPS ANTIBODY: CPT

## 2025-03-14 PROCEDURE — 84443 ASSAY THYROID STIM HORMONE: CPT

## 2025-03-14 PROCEDURE — 86765 RUBEOLA ANTIBODY: CPT

## 2025-03-14 PROCEDURE — 3075F SYST BP GE 130 - 139MM HG: CPT | Performed by: FAMILY MEDICINE

## 2025-03-14 PROCEDURE — 36415 COLL VENOUS BLD VENIPUNCTURE: CPT

## 2025-03-14 PROCEDURE — 87045 FECES CULTURE AEROBIC BACT: CPT

## 2025-03-14 PROCEDURE — 99214 OFFICE O/P EST MOD 30 MIN: CPT | Performed by: FAMILY MEDICINE

## 2025-03-14 PROCEDURE — 83036 HEMOGLOBIN GLYCOSYLATED A1C: CPT

## 2025-03-14 PROCEDURE — 82043 UR ALBUMIN QUANTITATIVE: CPT

## 2025-03-14 PROCEDURE — 87493 C DIFF AMPLIFIED PROBE: CPT

## 2025-03-14 PROCEDURE — 85025 COMPLETE CBC W/AUTO DIFF WBC: CPT

## 2025-03-14 PROCEDURE — 87015 SPECIMEN INFECT AGNT CONCNTJ: CPT

## 2025-03-14 PROCEDURE — 82570 ASSAY OF URINE CREATININE: CPT

## 2025-03-14 PROCEDURE — 82306 VITAMIN D 25 HYDROXY: CPT

## 2025-03-14 PROCEDURE — 3078F DIAST BP <80 MM HG: CPT | Performed by: FAMILY MEDICINE

## 2025-03-14 PROCEDURE — 80061 LIPID PANEL: CPT

## 2025-03-14 NOTE — PROGRESS NOTES
Tye Sylvester  9/9/1960    Chief Complaint   Patient presents with    Fatigue     Room 2, ASZ, pt having diarrhea for 2 months, early in the evening gets very tired and falling asleep, R foot swollen recently.     Diarrhea       HPI:   Tye Sylvester is a 64 year old male who presents for follow-up. He recently retired. He has struggled with abdominal bloating, gas and belching with daily diarrhea as well. He has been doing well with his DM regimen and his GMI has hovered a bit over 7. He has noted more fatigue as well that began prior to his prison and prior to his GI symptoms. He has been consistent with his CPAP but did not schedule visit to establish with sleep medicine. No blood in his stool, no true abdominal pain.      Current Outpatient Medications   Medication Sig Dispense Refill    olopatadine 0.1 % Ophthalmic Solution Apply 1 drop to eye 2 (two) times daily. 5 mL 0    mupirocin 2 % External Ointment Apply 1 Application topically 2 (two) times daily. 22 g 0    Continuous Glucose Sensor (FREESTYLE CELESTINE 3 SENSOR) Does not apply Misc 1 each every 14 (fourteen) days. Apply sensor every 14 days 6 each 3    SITagliptin-metFORMIN HCl (JANUMET)  MG Oral Tab Take 1 tablet by mouth 2 (two) times daily with meals. 180 tablet 3    Insulin Pen Needle (PEN NEEDLES) 32G X 4 MM Does not apply Misc Inject 1 Needle into the skin at bedtime. 100 each 3    pioglitazone 30 MG Oral Tab Take 1 tablet (30 mg total) by mouth daily. 90 tablet 3    rosuvastatin 10 MG Oral Tab Take 1 tablet (10 mg total) by mouth nightly. (Patient taking differently: Take 1 tablet (10 mg total) by mouth daily.) 90 tablet 3    Insulin Glargine-yfgn 100 UNIT/ML Subcutaneous Solution Pen-injector Inject 20 Units into the skin nightly. 21 mL 3    Fosinopril Sodium 20 MG Oral Tab Take 1 tablet (20 mg total) by mouth daily. 90 tablet 3    PREVIDENT 5000 DRY MOUTH 1.1 % Dental Gel       Fluticasone Propionate 50 MCG/ACT Nasal  Suspension Two sprays in each nostril once daily 1 Bottle 6    oxyCODONE 5 MG Oral Tab Take 1 tablet (5 mg total) by mouth every 6 (six) hours as needed. 5 tablet 0    fluticasone propionate 50 MCG/ACT Nasal Suspension 2 sprays by Nasal route daily. 16 g 0    benzonatate 200 MG Oral Cap Take 1 capsule (200 mg total) by mouth 3 (three) times daily as needed. (Patient not taking: Reported on 3/14/2025) 20 capsule 0    semaglutide 4 MG/3ML Subcutaneous Solution Pen-injector Inject 1 mg into the skin once a week. (Patient taking differently: Inject 1 mg into the skin once a week. ON SATURDAY) 9 mL 0    clarithromycin 500 MG Oral Tab Take 1 tablet (500 mg total) by mouth 2 (two) times daily. (Patient not taking: Reported on 3/14/2025) 20 tablet 0    pantoprazole 20 MG Oral Tab EC Take 1 tablet (20 mg total) by mouth before breakfast. (Patient not taking: Reported on 3/14/2025) 90 tablet 0      Allergies[1]   Past Medical History:    Allergic rhinitis    Arthritis    Asthma (HCC)    Diabetes (HCC)    Diabetes mellitus (HCC)    Diverticulosis of intestine    Essential hypertension    High blood pressure    High cholesterol    History of colon polyps    History of stomach ulcers    Hyperlipidemia    Hypertension    Irregular bowel habits    Obesity    MADY (obstructive sleep apnea)    on CPAP 9cm    PONV (postoperative nausea and vomiting)    Sleep apnea    CPAP    Visual impairment    GLASSES    Wears glasses    Weight gain    After taking Type 2 Diabetes Medicines      Patient Active Problem List   Diagnosis    History of colon polyps    Allergic rhinitis    Sleep apnea, obstructive    Obesity, unspecified    Essential hypertension, benign    Mixed hyperlipidemia    Type 2 diabetes mellitus with microalbuminuria, without long-term current use of insulin (HCC)    Sinusitis, chronic    History of adenomatous polyp of colon    Abscess of anal and rectal regions    Anal fistula    Asthma without status asthmaticus ([facility])     Dysphagia, unspecified    Gastric ulcer, acute with hemorrhage      Past Surgical History:   Procedure Laterality Date    Colonoscopy  2008    Colonoscopy  01/11/2017    adenomatous polyp, diverticuli and int hem    Knee arthroscopy  1988    Other surgical history      deviated septum, nasal surgery    Other surgical history      arthroscopic knee surgery right    Stomach surgery procedure unlisted  1990      Family History   Problem Relation Age of Onset    Prostate Cancer Father     Diabetes Mother     Hypertension Mother       Social History     Socioeconomic History    Marital status:     Number of children: 2   Occupational History    Occupation: [employer]    Tobacco Use    Smoking status: Never    Smokeless tobacco: Never   Vaping Use    Vaping status: Never Used   Substance and Sexual Activity    Alcohol use: Yes     Alcohol/week: 2.0 standard drinks of alcohol     Types: 1 Glasses of wine, 1 Cans of beer per week     Comment: RARE    Drug use: Never    Sexual activity: Not Currently   Other Topics Concern    Caffeine Concern No    Exercise No    Seat Belt No    Special Diet No    Stress Concern No    Weight Concern Yes         REVIEW OF SYSTEMS:   GENERAL: feels well otherwise  SKIN: no rashes  LUNGS: no new dyspnea  CARDIOVASCULAR: no new chest pain  GI: see HPI    EXAM:   /78 (BP Location: Right arm, Patient Position: Sitting, Cuff Size: adult)   Pulse 76   Temp 96.9 °F (36.1 °C) (Tympanic)   Wt (!) 315 lb (142.9 kg)   SpO2 98%   BMI 40.44 kg/m²   GENERAL: Well developed, well nourished,in no apparent distress  SKIN: No rashes,no suspicious lesions  EYES: PERRLA, EOMI, conjunctiva are clear  HEENT: atraumatic, normocephalic  LUNGS: clear to auscultation  CARDIO: RRR without murmur  GI: good BS's,no masses, HSM or tenderness    ASSESSMENT AND PLAN:   Tye Sylvester is a 64 year old male who presents for follow-up    Abdominal bloating  Diarrhea, unspecified type  No  associated abdominal pain or emesis. Will stop [place] supplement and see if symptoms improve. Will check stool studies as below.   - CMP Now; Future  - CBC Now; Future  - C. diff toxigenic PCR (OPT) [E]; Future  - Stool Culture w/Shigatoxin [E]; Future    MADY on CPAP, Other fatigue  Has been consistent with CPAP, referral placed to establish with sleep medicine and check serologies as below  - Pulmonary Referral - In Network  - Vitamin B12 [E]; Future  - TSH W Reflex To Free T4 [E]; Future  - Vitamin D [E]; Future    Type 2 diabetes mellitus with microalbuminuria, without long-term current use of insulin (HCC)  CGM data has shown significant improvement. Will check DM labs today.   - CMP Now; Future  - Lipids Now; Future  - Hemoglobin A1C Now; Future  - Microalb/Creat Ratio, Random Urine Now; Future    Immunity status testing  - MMR Panel([facility]); Future    All questions were answered and the patient agrees with the plan.     Thank you,  Michael Ashton MD         [1]   Allergies  Allergen Reactions    Cephalosporins HIVES    Penicillins HIVES

## 2025-03-15 LAB — C DIFF TOX B STL QL: NEGATIVE

## 2025-03-17 LAB
MEV IGG SER-ACNC: >300 AU/ML (ref 16.5–?)
MUV IGG SER IA-ACNC: <5 AU/ML (ref 11–?)

## 2025-03-25 DIAGNOSIS — R80.9 TYPE 2 DIABETES MELLITUS WITH MICROALBUMINURIA, WITHOUT LONG-TERM CURRENT USE OF INSULIN (HCC): ICD-10-CM

## 2025-03-25 DIAGNOSIS — E11.29 TYPE 2 DIABETES MELLITUS WITH MICROALBUMINURIA, WITHOUT LONG-TERM CURRENT USE OF INSULIN (HCC): ICD-10-CM

## 2025-03-26 DIAGNOSIS — R19.7 DIARRHEA, UNSPECIFIED TYPE: ICD-10-CM

## 2025-03-26 DIAGNOSIS — E55.9 VITAMIN D DEFICIENCY: ICD-10-CM

## 2025-03-26 DIAGNOSIS — R80.9 TYPE 2 DIABETES MELLITUS WITH MICROALBUMINURIA, WITHOUT LONG-TERM CURRENT USE OF INSULIN (HCC): Primary | ICD-10-CM

## 2025-03-26 DIAGNOSIS — E11.29 TYPE 2 DIABETES MELLITUS WITH MICROALBUMINURIA, WITHOUT LONG-TERM CURRENT USE OF INSULIN (HCC): Primary | ICD-10-CM

## 2025-03-26 RX ORDER — ERGOCALCIFEROL 1.25 MG/1
50000 CAPSULE, LIQUID FILLED ORAL WEEKLY
Qty: 12 CAPSULE | Refills: 0 | Status: SHIPPED | OUTPATIENT
Start: 2025-03-26

## 2025-03-31 RX ORDER — SEMAGLUTIDE 1.34 MG/ML
INJECTION, SOLUTION SUBCUTANEOUS
Qty: 9 ML | Refills: 0 | OUTPATIENT
Start: 2025-03-31

## 2025-05-12 NOTE — PROGRESS NOTES
This is a 64 year old male who presents with the following symptoms, risk factors, behaviors or other items associated with sleep problems.    Sleep Apnea:   (Patient-Rptd) nasal congestion; overweight; high blood pressure; diabetes; previous sleep study; current CPAP use  Insomnia:  (Patient-Rptd) no symptoms of Insomnia  Restless Leg:  (Patient-Rptd) no symptoms or restless legs  Parasomnias:   (Patient-Rptd) no symptoms of parasomnias  Daytime Problems:  (Patient-Rptd) previous sleep study; no symptoms of daytime problems    The patient's Dillon Sleepiness score is (Patient-Rptd) 5/24.

## 2025-05-13 ENCOUNTER — OFFICE VISIT (OUTPATIENT)
Facility: CLINIC | Age: 65
End: 2025-05-13
Payer: COMMERCIAL

## 2025-05-13 VITALS
RESPIRATION RATE: 18 BRPM | WEIGHT: 315 LBS | BODY MASS INDEX: 40.43 KG/M2 | SYSTOLIC BLOOD PRESSURE: 136 MMHG | OXYGEN SATURATION: 98 % | HEART RATE: 86 BPM | HEIGHT: 74 IN | DIASTOLIC BLOOD PRESSURE: 80 MMHG

## 2025-05-13 DIAGNOSIS — J45.20 MILD INTERMITTENT ASTHMA WITHOUT STATUS ASTHMATICUS WITHOUT COMPLICATION (HCC): Primary | ICD-10-CM

## 2025-05-13 DIAGNOSIS — G47.33 SLEEP APNEA, OBSTRUCTIVE: ICD-10-CM

## 2025-05-13 DIAGNOSIS — E11.29 TYPE 2 DIABETES MELLITUS WITH MICROALBUMINURIA, WITHOUT LONG-TERM CURRENT USE OF INSULIN (HCC): ICD-10-CM

## 2025-05-13 DIAGNOSIS — R80.9 TYPE 2 DIABETES MELLITUS WITH MICROALBUMINURIA, WITHOUT LONG-TERM CURRENT USE OF INSULIN (HCC): ICD-10-CM

## 2025-05-13 DIAGNOSIS — E66.813 CLASS 3 SEVERE OBESITY DUE TO EXCESS CALORIES WITHOUT SERIOUS COMORBIDITY WITH BODY MASS INDEX (BMI) OF 40.0 TO 44.9 IN ADULT: ICD-10-CM

## 2025-05-13 PROCEDURE — 3008F BODY MASS INDEX DOCD: CPT | Performed by: OTHER

## 2025-05-13 PROCEDURE — 3079F DIAST BP 80-89 MM HG: CPT | Performed by: OTHER

## 2025-05-13 PROCEDURE — 3075F SYST BP GE 130 - 139MM HG: CPT | Performed by: OTHER

## 2025-05-13 PROCEDURE — 99204 OFFICE O/P NEW MOD 45 MIN: CPT | Performed by: OTHER

## 2025-05-13 NOTE — PROGRESS NOTES
[facility]  SLEEP PROGRESS NOTE        HPI:   This is a 64 year old male coming in for   Chief Complaint   Patient presents with    Obstructive Sleep Apnea (MADY)     Dme:  APRIA  Mask: FFM       HPI: machine is 13 years old  New to us  Could not find records  Sleeps well with cpap  Can lay on his back  Feels he does need cpap  Uses FFM    Weight is stable  Today 321  No RLS, no cramps  Swelling in right leg  Patient: Sleep review of systems today: see form.      Pt  PCP:  Michael Ashton MD  No referring provider defined for this encounter.           No data to display                    Past Medical History[1]  Past Surgical History[2]  Social History:  Social History     Social History Narrative    Not on file     Short Social Hx on File[3]  Family History:  Family History[4]  Allergies:  Allergies[5]  Current Meds:  Current Medications[6]   Counseling given: Not Answered         Problem List:  Problem List[7]    REVIEW OF SYSTEMS:   Review of Systems    EXAM:   /80 (BP Location: Right arm, Patient Position: Sitting, Cuff Size: adult)   Pulse 86   Resp 18   Ht 6' 2\" (1.88 m)   Wt (!) 321 lb (145.6 kg)   SpO2 98%   BMI 41.21 kg/m²  Estimated body mass index is 41.21 kg/m² as calculated from the following:    Height as of this encounter: 6' 2\" (1.88 m).    Weight as of this encounter: 321 lb (145.6 kg).   Neck in inches:      Wt Readings from Last 6 Encounters:   05/13/25 (!) 321 lb (145.6 kg)   03/14/25 (!) 315 lb (142.9 kg)   01/23/25 (!) 318 lb (144.2 kg)   01/13/25 (!) 318 lb 2 oz (144.3 kg)   01/03/25 (!) 315 lb (142.9 kg)   12/19/24 (!) 324 lb (147 kg)     BP Readings from Last 3 Encounters:   05/13/25 136/80   03/14/25 132/78   02/21/25 152/88     Pulse Readings from Last 3 Encounters:   05/13/25 86   03/14/25 76   02/21/25 73     SpO2 Readings from Last 3 Encounters:   05/13/25 98%   03/14/25 98%   02/21/25 100%      Ideal body weight: 82.2 kg (181 lb 3.5 oz)  Adjusted ideal body weight: 107.6  kg (237 lb 2.1 oz)    Vital signs reviewed.  Physical Exam    ASSESSMENT AND PLAN:   1. Mild intermittent asthma without status asthmaticus without complication (HCC)    2. Type 2 diabetes mellitus with microalbuminuria, without long-term current use of insulin (HCC)    3. Class 3 severe obesity due to excess calories without serious comorbidity with body mass index (BMI) of 40.0 to 44.9 in adult  BMI 41  4. Sleep apnea, obstructive  Patient is using and benefiting from regular cpap use.  Patient was instructed to clean equipment on a weekly basis.  Patient was instructed to keep up to date with supplies.  Patient was informed to avoid drowsy driving, or using heavy machinery.  Needs new split study and device  Followup 31-90 day  There are no Patient Instructions on file for this visit.    Independent interpretation of Sleep Download as defined above.  Continue with Rx management of Sleep apnea with PAP therapy.    COMPLIANCE is required by insurance for 4 hours a night most nights of the week.    Advised if still with sleep apnea and not using CPAP has a 7 fold increase in risk of heart attack, stroke, abnormal heart rhythm  and death,  increased risk of driving accidents.     Advised to refrain from driving when sleepy.      Recommend weight loss, and maintain and optimal BMI with Exercise 30 minutes most days to target heart rate .     Advised patient to change filters,masks,hoses  and tubes and equiptment on a  regular schedule.    Filters and seals shall be changed every 1 month,  Hoses every 3 months,   CPAP mask and humidifier chamber changed every 6 month  with the durable medical equipment provider.         Meds & Refills for this Visit:  Requested Prescriptions      No prescriptions requested or ordered in this encounter       Outcome: Parent verbalizes understanding. Parent is notified to call with any questions, complications, allergies, or worsening or changing symptoms.  Parent is to call with any  side effects or complications from the treatments as a result of today.     \" This note was created utilizing Dragon speech recognition software.  Please excuse any grammatical errors. Call my office if you have any questions regarding this note. \"     Whitney Fitzgerald DO  5/13/2025  2:06 PM         [1]   Past Medical History:   Allergic rhinitis    Arthritis    Asthma (HCC)    Diabetes (HCC)    Diabetes mellitus (HCC)    Diverticulosis of intestine    Essential hypertension    High blood pressure    High cholesterol    History of colon polyps    History of stomach ulcers    Hyperlipidemia    Hypertension    Irregular bowel habits    Obesity    MADY (obstructive sleep apnea)    on CPAP 9cm    PONV (postoperative nausea and vomiting)    Sleep apnea    CPAP    Visual impairment    GLASSES    Wears glasses    Weight gain    After taking Type 2 Diabetes Medicines   [2]   Past Surgical History:  Procedure Laterality Date    Colonoscopy  2008    Colonoscopy  01/11/2017    adenomatous polyp, diverticuli and int hem    Knee arthroscopy  1988    Other surgical history      deviated septum, nasal surgery    Other surgical history      arthroscopic knee surgery right    Stomach surgery procedure unlisted  1990   [3]   Social History  Socioeconomic History    Marital status:     Number of children: 2   Occupational History    Occupation: [email or web address]    Tobacco Use    Smoking status: Never    Smokeless tobacco: Never   Vaping Use    Vaping status: Never Used   Substance and Sexual Activity    Alcohol use: Yes     Alcohol/week: 2.0 standard drinks of alcohol     Types: 1 Glasses of wine, 1 Cans of beer per week     Comment: RARE    Drug use: Never    Sexual activity: Not Currently   Other Topics Concern    Caffeine Concern No    Exercise No    Seat Belt No    Special Diet No    Stress Concern No    Weight Concern Yes   [4]   Family History  Problem Relation Age of Onset    Prostate Cancer Father     Diabetes  Mother     Hypertension Mother    [5]   Allergies  Allergen Reactions    Cephalosporins HIVES    Penicillins HIVES   [6]   Current Outpatient Medications   Medication Sig Dispense Refill    semaglutide 8 MG/3ML Subcutaneous Solution Pen-injector Inject 2 mg into the skin once a week. 9 mL 0    Continuous Glucose Sensor (FREESTYLE CELESTINE 3 SENSOR) Does not apply Misc 1 each every 14 (fourteen) days. Apply sensor every 14 days 6 each 3    SITagliptin-metFORMIN HCl (JANUMET)  MG Oral Tab Take 1 tablet by mouth 2 (two) times daily with meals. 180 tablet 3    Insulin Pen Needle (PEN NEEDLES) 32G X 4 MM Does not apply Misc Inject 1 Needle into the skin at bedtime. 100 each 3    pioglitazone 30 MG Oral Tab Take 1 tablet (30 mg total) by mouth daily. 90 tablet 3    rosuvastatin 10 MG Oral Tab Take 1 tablet (10 mg total) by mouth nightly. (Patient taking differently: Take 1 tablet (10 mg total) by mouth daily.) 90 tablet 3    Insulin Glargine-yfgn 100 UNIT/ML Subcutaneous Solution Pen-injector Inject 20 Units into the skin nightly. 21 mL 3    Fosinopril Sodium 20 MG Oral Tab Take 1 tablet (20 mg total) by mouth daily. 90 tablet 3    pantoprazole 20 MG Oral Tab EC Take 1 tablet (20 mg total) by mouth before breakfast. 90 tablet 0    PREVIDENT 5000 DRY MOUTH 1.1 % Dental Gel       Fluticasone Propionate 50 MCG/ACT Nasal Suspension Two sprays in each nostril once daily 1 Bottle 6    ergocalciferol 1.25 MG (43392 UT) Oral Cap Take 1 capsule (50,000 Units total) by mouth once a week. 12 capsule 0    olopatadine 0.1 % Ophthalmic Solution Apply 1 drop to eye 2 (two) times daily. 5 mL 0    mupirocin 2 % External Ointment Apply 1 Application topically 2 (two) times daily. 22 g 0    oxyCODONE 5 MG Oral Tab Take 1 tablet (5 mg total) by mouth every 6 (six) hours as needed. 5 tablet 0    clarithromycin 500 MG Oral Tab Take 1 tablet (500 mg total) by mouth 2 (two) times daily. (Patient not taking: Reported on 3/14/2025) 20 tablet 0    [7]   Patient Active Problem List  Diagnosis    History of colon polyps    Allergic rhinitis    Sleep apnea, obstructive    Obesity, unspecified    Essential hypertension, benign    Mixed hyperlipidemia    Type 2 diabetes mellitus with microalbuminuria, without long-term current use of insulin (HCC)    Sinusitis, chronic    History of adenomatous polyp of colon    Abscess of anal and rectal regions    Anal fistula    Asthma without status asthmaticus (HCC)    Dysphagia, unspecified    Gastric ulcer, acute with hemorrhage

## 2025-06-09 ENCOUNTER — HOSPITAL ENCOUNTER (OUTPATIENT)
Dept: GENERAL RADIOLOGY | Age: 65
Discharge: HOME | End: 2025-06-09
Attending: FAMILY MEDICINE
Payer: COMMERCIAL

## 2025-06-09 ENCOUNTER — OFFICE VISIT (OUTPATIENT)
Age: 65
End: 2025-06-09
Payer: COMMERCIAL

## 2025-06-09 VITALS
HEART RATE: 90 BPM | HEIGHT: 74 IN | SYSTOLIC BLOOD PRESSURE: 136 MMHG | RESPIRATION RATE: 16 BRPM | BODY MASS INDEX: 40.43 KG/M2 | OXYGEN SATURATION: 98 % | TEMPERATURE: 97 F | WEIGHT: 315 LBS | DIASTOLIC BLOOD PRESSURE: 66 MMHG

## 2025-06-09 DIAGNOSIS — M79.661 PAIN AND SWELLING OF RIGHT LOWER LEG: Primary | ICD-10-CM

## 2025-06-09 DIAGNOSIS — M79.89 PAIN AND SWELLING OF RIGHT LOWER LEG: Primary | ICD-10-CM

## 2025-06-09 DIAGNOSIS — M79.671 RIGHT FOOT PAIN: ICD-10-CM

## 2025-06-09 DIAGNOSIS — M25.571 ACUTE RIGHT ANKLE PAIN: ICD-10-CM

## 2025-06-09 PROCEDURE — 3046F HEMOGLOBIN A1C LEVEL >9.0%: CPT | Performed by: FAMILY MEDICINE

## 2025-06-09 PROCEDURE — 73630 X-RAY EXAM OF FOOT: CPT | Performed by: FAMILY MEDICINE

## 2025-06-09 PROCEDURE — 3078F DIAST BP <80 MM HG: CPT | Performed by: FAMILY MEDICINE

## 2025-06-09 PROCEDURE — 3060F POS MICROALBUMINURIA REV: CPT | Performed by: FAMILY MEDICINE

## 2025-06-09 PROCEDURE — 99214 OFFICE O/P EST MOD 30 MIN: CPT | Performed by: FAMILY MEDICINE

## 2025-06-09 PROCEDURE — 3061F NEG MICROALBUMINURIA REV: CPT | Performed by: FAMILY MEDICINE

## 2025-06-09 PROCEDURE — 3008F BODY MASS INDEX DOCD: CPT | Performed by: FAMILY MEDICINE

## 2025-06-09 PROCEDURE — 3075F SYST BP GE 130 - 139MM HG: CPT | Performed by: FAMILY MEDICINE

## 2025-06-09 PROCEDURE — 73610 X-RAY EXAM OF ANKLE: CPT | Performed by: FAMILY MEDICINE

## 2025-06-09 NOTE — PROGRESS NOTES
Tye Sylvester  9/9/1960    Chief Complaint   Patient presents with    Swollen Feet     Right foot swollen radiating to ankle.       HPI:   Tye Sylvester is a 64 year old male who presents for evaluation of atraumatic R ankle and foot swelling and pain. Has swelling from mid leg to his foot and pain in the medial ankle and foot. Has had some recent trips since nursing home but has not been inactive. Swelling started in early April.    Current Medications[1]   Allergies[2]   Past Medical History[3]   Problem List[4]   Past Surgical History[5]   Family History[6]   Short Social Hx on File[7]      REVIEW OF SYSTEMS:   GENERAL: feels well otherwise, no calf redness, no dyspnea    EXAM:   /66 (BP Location: Right arm)   Pulse 90   Temp 97.2 °F (36.2 °C) (Oral)   Resp 16   Ht 6' 2\" (1.88 m)   Wt (!) 328 lb 9.6 oz (149.1 kg)   SpO2 98%   BMI 42.19 kg/m²   GENERAL: Well developed, well nourished,in no apparent distress  SKIN: No rash  LUNGS: clear to auscultation  CARDIO: RRR without murmur  EXT: edema from mid calf through the foot of the RLE. Mild medial ankle and foot tenderness. Negative Domingo's, no calf tenderness.     ASSESSMENT AND PLAN:   Tye Sylvester is a 64 year old male who presents for evaluation    Pain and swelling of right lower leg  Acute right ankle and foot pain  Symptoms began atraumatically. Will check foot and ankle XR given bony tenderness and D-DIMER to rule out DVT given unilateral swelling and recent travel. Discussed compression and elevation and will discuss results when received.   - D-Dimer [E]; Future  - XR ANKLE (MIN 3 VIEWS), RIGHT (CPT=73610); Future  - XR FOOT, COMPLETE (MIN 3 VIEWS), RIGHT (CPT=73630); Future      All questions were answered and the patient agrees with the plan.     Thank you,  Michael Ashton MD         [1]   Current Outpatient Medications   Medication Sig Dispense Refill    semaglutide 8 MG/3ML Subcutaneous Solution Pen-injector Inject 2 mg  into the skin once a week. 9 mL 0    Continuous Glucose Sensor (FREESTYLE CELESTINE 3 SENSOR) Does not apply Misc 1 each every 14 (fourteen) days. Apply sensor every 14 days 6 each 3    SITagliptin-metFORMIN HCl (JANUMET)  MG Oral Tab Take 1 tablet by mouth 2 (two) times daily with meals. 180 tablet 3    Insulin Pen Needle (PEN NEEDLES) 32G X 4 MM Does not apply Misc Inject 1 Needle into the skin at bedtime. 100 each 3    pioglitazone 30 MG Oral Tab Take 1 tablet (30 mg total) by mouth daily. 90 tablet 3    rosuvastatin 10 MG Oral Tab Take 1 tablet (10 mg total) by mouth nightly. (Patient taking differently: Take 1 tablet (10 mg total) by mouth daily.) 90 tablet 3    Insulin Glargine-yfgn 100 UNIT/ML Subcutaneous Solution Pen-injector Inject 20 Units into the skin nightly. 21 mL 3    Fosinopril Sodium 20 MG Oral Tab Take 1 tablet (20 mg total) by mouth daily. 90 tablet 3    pantoprazole 20 MG Oral Tab EC Take 1 tablet (20 mg total) by mouth before breakfast. 90 tablet 0    PREVIDENT 5000 DRY MOUTH 1.1 % Dental Gel       Fluticasone Propionate 50 MCG/ACT Nasal Suspension Two sprays in each nostril once daily 1 Bottle 6   [2]   Allergies  Allergen Reactions    Cephalosporins HIVES    Penicillins HIVES   [3]   Past Medical History:   Allergic rhinitis    Arthritis    Asthma (HCC)    Diabetes (HCC)    Diabetes mellitus (HCC)    Diverticulosis of intestine    Essential hypertension    High blood pressure    High cholesterol    History of colon polyps    History of stomach ulcers    Hyperlipidemia    Hypertension    Irregular bowel habits    Obesity    MADY (obstructive sleep apnea)    on CPAP 9cm    PONV (postoperative nausea and vomiting)    Sleep apnea    CPAP    Visual impairment    GLASSES    Wears glasses    Weight gain    After taking Type 2 Diabetes Medicines   [4]   Patient Active Problem List  Diagnosis    History of colon polyps    Allergic rhinitis    Sleep apnea, obstructive    Obesity, unspecified     Essential hypertension, benign    Mixed hyperlipidemia    Type 2 diabetes mellitus with microalbuminuria, without long-term current use of insulin (HCC)    Sinusitis, chronic    History of adenomatous polyp of colon    Abscess of anal and rectal regions    Anal fistula    Asthma without status asthmaticus (HCC)    Dysphagia, unspecified    Gastric ulcer, acute with hemorrhage   [5]   Past Surgical History:  Procedure Laterality Date    Colonoscopy  2008    Colonoscopy  01/11/2017    adenomatous polyp, diverticuli and int hem    Knee arthroscopy  1988    Other surgical history      deviated septum, nasal surgery    Other surgical history      arthroscopic knee surgery right    Stomach surgery procedure unlisted  1990   [6]   Family History  Problem Relation Age of Onset    Prostate Cancer Father     Diabetes Mother     Hypertension Mother    [7]   Social History  Socioeconomic History    Marital status:     Number of children: 2   Occupational History    Occupation: [employer]    Tobacco Use    Smoking status: Never    Smokeless tobacco: Never   Vaping Use    Vaping status: Never Used   Substance and Sexual Activity    Alcohol use: Yes     Alcohol/week: 2.0 standard drinks of alcohol     Types: 1 Glasses of wine, 1 Cans of beer per week     Comment: RARE    Drug use: Never    Sexual activity: Not Currently   Other Topics Concern    Caffeine Concern No    Exercise No    Seat Belt No    Special Diet No    Stress Concern No    Weight Concern Yes

## 2025-06-10 ENCOUNTER — LAB ENCOUNTER (OUTPATIENT)
Dept: LAB | Age: 65
End: 2025-06-10
Attending: FAMILY MEDICINE
Payer: COMMERCIAL

## 2025-06-10 ENCOUNTER — TELEPHONE (OUTPATIENT)
Age: 65
End: 2025-06-10

## 2025-06-10 DIAGNOSIS — M79.661 PAIN AND SWELLING OF RIGHT LOWER LEG: ICD-10-CM

## 2025-06-10 DIAGNOSIS — M79.89 PAIN AND SWELLING OF RIGHT LOWER LEG: ICD-10-CM

## 2025-06-10 LAB — D DIMER PPP FEU-MCNC: 0.97 UG/ML FEU (ref ?–0.64)

## 2025-06-10 PROCEDURE — 85379 FIBRIN DEGRADATION QUANT: CPT | Performed by: FAMILY MEDICINE

## 2025-06-11 ENCOUNTER — HOSPITAL ENCOUNTER (OUTPATIENT)
Dept: ULTRASOUND IMAGING | Age: 65
Discharge: HOME | End: 2025-06-11
Attending: FAMILY MEDICINE
Payer: COMMERCIAL

## 2025-06-11 DIAGNOSIS — M79.89 PAIN AND SWELLING OF RIGHT LOWER LEG: ICD-10-CM

## 2025-06-11 DIAGNOSIS — I10 ESSENTIAL HYPERTENSION: ICD-10-CM

## 2025-06-11 DIAGNOSIS — M79.661 PAIN AND SWELLING OF RIGHT LOWER LEG: ICD-10-CM

## 2025-06-11 DIAGNOSIS — E11.65 TYPE 2 DIABETES MELLITUS WITH HYPERGLYCEMIA, WITHOUT LONG-TERM CURRENT USE OF INSULIN (HCC): ICD-10-CM

## 2025-06-11 PROCEDURE — 93971 EXTREMITY STUDY: CPT | Performed by: FAMILY MEDICINE

## 2025-06-11 RX ORDER — FOSINOPRIL SODIUM 20 MG/1
20 TABLET ORAL DAILY
Qty: 90 TABLET | Refills: 3 | Status: SHIPPED | OUTPATIENT
Start: 2025-06-11

## 2025-06-11 NOTE — TELEPHONE ENCOUNTER
Refilled per protocol         Hypertension Medications Protocol Passed   Fosinopril Sodium 20 MG Oral Tab  6/11/2025  9:49 AM    CMP or BMP in past 12 months    Last BP reading less than 140/90    In person appointment or virtual visit in the past 12 mos or appointment in next 3 mos    EGFRCR or GFRNAA > 50    Medication is active on med list

## 2025-06-12 RX ORDER — ROSUVASTATIN CALCIUM 10 MG/1
10 TABLET, COATED ORAL NIGHTLY
Qty: 90 TABLET | Refills: 3 | OUTPATIENT
Start: 2025-06-12

## 2025-06-18 ENCOUNTER — HOSPITAL ENCOUNTER (OUTPATIENT)
Dept: LAB | Facility: HOSPITAL | Age: 65
Discharge: HOME | End: 2025-06-18
Attending: FAMILY MEDICINE
Payer: COMMERCIAL

## 2025-06-18 DIAGNOSIS — R80.9 TYPE 2 DIABETES MELLITUS WITH MICROALBUMINURIA, WITHOUT LONG-TERM CURRENT USE OF INSULIN (HCC): ICD-10-CM

## 2025-06-18 DIAGNOSIS — E11.29 TYPE 2 DIABETES MELLITUS WITH MICROALBUMINURIA, WITHOUT LONG-TERM CURRENT USE OF INSULIN (HCC): ICD-10-CM

## 2025-06-18 LAB
ALBUMIN SERPL-MCNC: 4.6 G/DL (ref 3.2–4.8)
ALBUMIN/GLOB SERPL: 1.9 {RATIO} (ref 1–2)
ALP LIVER SERPL-CCNC: 89 U/L (ref 45–117)
ALT SERPL-CCNC: 17 U/L (ref 10–49)
ANION GAP SERPL CALC-SCNC: 7 MMOL/L (ref 0–18)
AST SERPL-CCNC: 16 U/L (ref ?–34)
BILIRUB SERPL-MCNC: 0.7 MG/DL (ref 0.2–1.1)
BUN BLD-MCNC: 16 MG/DL (ref 9–23)
CALCIUM BLD-MCNC: 9.3 MG/DL (ref 8.7–10.6)
CHLORIDE SERPL-SCNC: 104 MMOL/L (ref 98–112)
CHOLEST SERPL-MCNC: 97 MG/DL (ref ?–200)
CO2 SERPL-SCNC: 29 MMOL/L (ref 21–32)
CREAT BLD-MCNC: 0.95 MG/DL (ref 0.7–1.3)
EGFRCR SERPLBLD CKD-EPI 2021: 89 ML/MIN/1.73M2 (ref 60–?)
EST. AVERAGE GLUCOSE BLD GHB EST-MCNC: 194 MG/DL (ref 68–126)
FASTING PATIENT LIPID ANSWER: YES
FASTING STATUS PATIENT QL REPORTED: YES
GLOBULIN PLAS-MCNC: 2.4 G/DL (ref 2–3.5)
GLUCOSE BLD-MCNC: 203 MG/DL (ref 70–99)
HBA1C MFR BLD: 8.4 % (ref ?–5.7)
HDLC SERPL-MCNC: 35 MG/DL (ref 40–59)
LDLC SERPL CALC-MCNC: 41 MG/DL (ref ?–100)
NONHDLC SERPL-MCNC: 62 MG/DL (ref ?–130)
OSMOLALITY SERPL CALC.SUM OF ELEC: 297 MOSM/KG (ref 275–295)
POTASSIUM SERPL-SCNC: 4.6 MMOL/L (ref 3.5–5.1)
PROT SERPL-MCNC: 7 G/DL (ref 5.7–8.2)
SODIUM SERPL-SCNC: 140 MMOL/L (ref 136–145)
TRIGL SERPL-MCNC: 113 MG/DL (ref 30–149)
VLDLC SERPL CALC-MCNC: 16 MG/DL (ref 0–30)

## 2025-06-18 PROCEDURE — 83036 HEMOGLOBIN GLYCOSYLATED A1C: CPT | Performed by: FAMILY MEDICINE

## 2025-06-18 PROCEDURE — 80053 COMPREHEN METABOLIC PANEL: CPT | Performed by: FAMILY MEDICINE

## 2025-06-18 PROCEDURE — 36415 COLL VENOUS BLD VENIPUNCTURE: CPT | Performed by: FAMILY MEDICINE

## 2025-06-18 PROCEDURE — 80061 LIPID PANEL: CPT | Performed by: FAMILY MEDICINE

## 2025-06-20 ENCOUNTER — OFFICE VISIT (OUTPATIENT)
Age: 65
End: 2025-06-20
Payer: COMMERCIAL

## 2025-06-20 VITALS
OXYGEN SATURATION: 96 % | DIASTOLIC BLOOD PRESSURE: 72 MMHG | HEIGHT: 74 IN | RESPIRATION RATE: 18 BRPM | WEIGHT: 315 LBS | BODY MASS INDEX: 40.43 KG/M2 | TEMPERATURE: 97 F | HEART RATE: 64 BPM | SYSTOLIC BLOOD PRESSURE: 138 MMHG

## 2025-06-20 DIAGNOSIS — R22.41 LOCALIZED SWELLING OF RIGHT LOWER EXTREMITY: ICD-10-CM

## 2025-06-20 DIAGNOSIS — M19.071 ARTHRITIS OF RIGHT MIDFOOT: ICD-10-CM

## 2025-06-20 DIAGNOSIS — E11.29 TYPE 2 DIABETES MELLITUS WITH MICROALBUMINURIA, WITHOUT LONG-TERM CURRENT USE OF INSULIN (HCC): Primary | ICD-10-CM

## 2025-06-20 DIAGNOSIS — R80.9 TYPE 2 DIABETES MELLITUS WITH MICROALBUMINURIA, WITHOUT LONG-TERM CURRENT USE OF INSULIN (HCC): Primary | ICD-10-CM

## 2025-06-20 PROCEDURE — 3075F SYST BP GE 130 - 139MM HG: CPT | Performed by: FAMILY MEDICINE

## 2025-06-20 PROCEDURE — 99214 OFFICE O/P EST MOD 30 MIN: CPT | Performed by: FAMILY MEDICINE

## 2025-06-20 PROCEDURE — 3052F HG A1C>EQUAL 8.0%<EQUAL 9.0%: CPT | Performed by: FAMILY MEDICINE

## 2025-06-20 PROCEDURE — 3008F BODY MASS INDEX DOCD: CPT | Performed by: FAMILY MEDICINE

## 2025-06-20 PROCEDURE — 3078F DIAST BP <80 MM HG: CPT | Performed by: FAMILY MEDICINE

## 2025-06-20 RX ORDER — FUROSEMIDE 20 MG/1
20 TABLET ORAL DAILY
Qty: 10 TABLET | Refills: 0 | Status: SHIPPED | OUTPATIENT
Start: 2025-06-20

## 2025-06-20 RX ORDER — ACYCLOVIR 400 MG/1
1 TABLET ORAL DAILY
Qty: 1 EACH | Refills: 0 | Status: SHIPPED | OUTPATIENT
Start: 2025-06-20

## 2025-06-20 RX ORDER — ACYCLOVIR 400 MG/1
1 TABLET ORAL
Qty: 3 EACH | Refills: 11 | Status: SHIPPED | OUTPATIENT
Start: 2025-06-20

## 2025-06-20 NOTE — PROGRESS NOTES
Tye Sylvester  9/9/1960    Chief Complaint   Patient presents with    Diabetes     Follow up     Swelling     Follow up C/o right foot swelling x 5 weeks        HPI:   Tye Sylvester is a 64 year old male who presents for follow-up.  He has had continued right lower extremity swelling.  Completed his ultrasound that was negative for DVT and his radiographs which showed midfoot osteoarthritis.  He has no significant pain through the midfoot.  He did stop his 2 mg Ozempic due to GI upset of diarrhea and nausea.  His symptoms resolved after stopping.  He has continued to have trouble with his current CGM with his sensors dying within a few days of application..    Current Medications[1]   Allergies[2]   Past Medical History[3]   Problem List[4]   Past Surgical History[5]   Family History[6]   Short Social Hx on File[7]      REVIEW OF SYSTEMS:   GENERAL: feels well otherwise, no new chest pain or dyspnea.    EXAM:   /72   Pulse 64   Temp 96.9 °F (36.1 °C)   Resp 18   Ht 6' 2\" (1.88 m)   Wt (!) 328 lb (148.8 kg)   SpO2 96%   BMI 42.11 kg/m²   GENERAL: Well developed, well nourished,in no apparent distress  SKIN: No rash  LUNGS: clear to auscultation  CARDIO: RRR without murmur  Bilateral barefoot skin diabetic exam is normal, visualized feet and the appearance is normal.  Bilateral monofilament/sensation of both feet is normal.  Pulsation pedal pulse exam of both lower legs/feet is normal as well.        ASSESSMENT AND PLAN:   Tye Sylvester is a 64 year old male who presents  for follow-up.    Type 2 diabetes mellitus with microalbuminuria, without long-term current use of insulin ([facility])  A1c improved from prior but still not at goal.  He did stop his 2 mg Ozempic due to GI side effects.  He did tolerate the 1 mg dose better and we will return to that dose and continue to optimize diet.  We will continue his treatment otherwise.  Will also try switch to Dexcom as his current nate is not  functioning well.  Plan follow-up in office in 3 months with A1c.   - Continuous Glucose Sensor (DEXCOM G7 SENSOR) Does not apply Misc; 1 each Every 10 days. Use as directed every 10 days  Dispense: 3 each; Refill: 11  - Continuous Glucose  (DEXCOM G7 ) Does not apply Device; 1 each daily.  Dispense: 1 each; Refill: 0  - semaglutide 4 MG/3ML Subcutaneous Solution Pen-injector; Inject 1 mg into the skin once a week.  Dispense: 1 each; Refill: 12    Localized swelling of right lower extremity  Arthritis of right midfoot  Venous ultrasound shows no DVT.  Has had limited improvement with compression and elevation.  Will try a short course of Lasix and he will update me on his swelling on Monday.  - furosemide (LASIX) 20 MG Oral Tab; Take 1 tablet (20 mg total) by mouth daily.  Dispense: 10 tablet; Refill: 0          All questions were answered and the patient agrees with the plan.     Thank you,  Michael Ashton MD         [1]   Current Outpatient Medications   Medication Sig Dispense Refill    furosemide (LASIX) 20 MG Oral Tab Take 1 tablet (20 mg total) by mouth daily. 10 tablet 0    Continuous Glucose Sensor (DEXCOM G7 SENSOR) Does not apply Misc 1 each Every 10 days. Use as directed every 10 days 3 each 11    Continuous Glucose  (DEXCOM G7 ) Does not apply Device 1 each daily. 1 each 0    semaglutide 4 MG/3ML Subcutaneous Solution Pen-injector Inject 1 mg into the skin once a week. 1 each 12    Fosinopril Sodium 20 MG Oral Tab Take 1 tablet (20 mg total) by mouth daily. 90 tablet 3    Continuous Glucose Sensor (FREESTYLE CELESTINE 3 SENSOR) Does not apply Misc 1 each every 14 (fourteen) days. Apply sensor every 14 days 6 each 3    SITagliptin-metFORMIN HCl (JANUMET)  MG Oral Tab Take 1 tablet by mouth 2 (two) times daily with meals. 180 tablet 3    Insulin Pen Needle (PEN NEEDLES) 32G X 4 MM Does not apply Misc Inject 1 Needle into the skin at bedtime. 100 each 3    pioglitazone 30  MG Oral Tab Take 1 tablet (30 mg total) by mouth daily. 90 tablet 3    rosuvastatin 10 MG Oral Tab Take 1 tablet (10 mg total) by mouth nightly. 90 tablet 3    Insulin Glargine-yfgn 100 UNIT/ML Subcutaneous Solution Pen-injector Inject 20 Units into the skin nightly. 21 mL 3    PREVIDENT 5000 DRY MOUTH 1.1 % Dental Gel       Fluticasone Propionate 50 MCG/ACT Nasal Suspension Two sprays in each nostril once daily 1 Bottle 6    pantoprazole 20 MG Oral Tab EC Take 1 tablet (20 mg total) by mouth before breakfast. (Patient not taking: Reported on 6/20/2025) 90 tablet 0   [2]   Allergies  Allergen Reactions    Cephalosporins HIVES    Penicillins HIVES   [3]   Past Medical History:   Allergic rhinitis    Arthritis    Asthma (HCC)    Diabetes (HCC)    Diabetes mellitus (HCC)    Diverticulosis of intestine    Essential hypertension    High blood pressure    High cholesterol    History of colon polyps    History of stomach ulcers    Hyperlipidemia    Hypertension    Irregular bowel habits    Obesity    MADY (obstructive sleep apnea)    on CPAP 9cm    PONV (postoperative nausea and vomiting)    Sleep apnea    CPAP    Visual impairment    GLASSES    Wears glasses    Weight gain    After taking Type 2 Diabetes Medicines   [4]   Patient Active Problem List  Diagnosis    History of colon polyps    Allergic rhinitis    Sleep apnea, obstructive    Obesity, unspecified    Essential hypertension, benign    Mixed hyperlipidemia    Type 2 diabetes mellitus with microalbuminuria, without long-term current use of insulin (HCC)    Sinusitis, chronic    History of adenomatous polyp of colon    Abscess of anal and rectal regions    Anal fistula    Asthma without status asthmaticus (HCC)    Dysphagia, unspecified    Gastric ulcer, acute with hemorrhage   [5]   Past Surgical History:  Procedure Laterality Date    Colonoscopy  2008    Colonoscopy  01/11/2017    adenomatous polyp, diverticuli and int hem    Knee arthroscopy  1988    Other  surgical history      deviated septum, nasal surgery    Other surgical history      arthroscopic knee surgery right    Stomach surgery procedure unlisted  1990   [6]   Family History  Problem Relation Age of Onset    Prostate Cancer Father     Diabetes Mother     Hypertension Mother    [7]   Social History  Socioeconomic History    Marital status:     Number of children: 2   Occupational History    Occupation: [employer]    Tobacco Use    Smoking status: Never    Smokeless tobacco: Never   Vaping Use    Vaping status: Never Used   Substance and Sexual Activity    Alcohol use: Yes     Alcohol/week: 2.0 standard drinks of alcohol     Types: 1 Glasses of wine, 1 Cans of beer per week     Comment: RARE    Drug use: Never    Sexual activity: Not Currently   Other Topics Concern    Caffeine Concern No    Exercise No    Seat Belt No    Special Diet No    Stress Concern No    Weight Concern Yes

## 2025-06-26 ENCOUNTER — TELEPHONE (OUTPATIENT)
Age: 65
End: 2025-06-26

## 2025-06-26 DIAGNOSIS — R22.41 LOCALIZED SWELLING OF RIGHT FOOT: Primary | ICD-10-CM

## 2025-06-26 DIAGNOSIS — M19.071 ARTHRITIS OF RIGHT MIDFOOT: ICD-10-CM

## 2025-06-26 NOTE — TELEPHONE ENCOUNTER
Patient called stating Dr. Michael Ashton wanted him to call with update on his swollen ankle-he has been taking the water pills for 5 days now and said his ankle is not better or worse-still the same-what is next step?

## 2025-06-26 NOTE — TELEPHONE ENCOUNTER
Patient sent update of ankle swelling after Furosamide x 6 days. No improvement in swelling. No worsening swelling.

## 2025-06-26 NOTE — TELEPHONE ENCOUNTER
Continue compression, stop lasix and will see podiatry for his midfoot arthritis. Discussed with patient over the phone.

## 2025-07-01 ENCOUNTER — OFFICE VISIT (OUTPATIENT)
Dept: ORTHOPEDICS CLINIC | Facility: CLINIC | Age: 65
End: 2025-07-01
Payer: COMMERCIAL

## 2025-07-01 ENCOUNTER — TELEPHONE (OUTPATIENT)
Age: 65
End: 2025-07-01

## 2025-07-01 ENCOUNTER — TELEPHONE (OUTPATIENT)
Dept: PODIATRY CLINIC | Facility: CLINIC | Age: 65
End: 2025-07-01

## 2025-07-01 DIAGNOSIS — E11.29 TYPE 2 DIABETES MELLITUS WITH MICROALBUMINURIA, WITHOUT LONG-TERM CURRENT USE OF INSULIN (HCC): ICD-10-CM

## 2025-07-01 DIAGNOSIS — E11.610 TYPE 2 DIABETES MELLITUS WITH DIABETIC NEUROPATHIC ARTHROPATHY, WITHOUT LONG-TERM CURRENT USE OF INSULIN (HCC): ICD-10-CM

## 2025-07-01 DIAGNOSIS — M14.679 CHARCOT ARTHROPATHY OF MIDFOOT: Primary | ICD-10-CM

## 2025-07-01 DIAGNOSIS — R80.9 TYPE 2 DIABETES MELLITUS WITH MICROALBUMINURIA, WITHOUT LONG-TERM CURRENT USE OF INSULIN (HCC): ICD-10-CM

## 2025-07-01 DIAGNOSIS — M79.89 SWELLING OF RIGHT FOOT: ICD-10-CM

## 2025-07-01 PROCEDURE — 99204 OFFICE O/P NEW MOD 45 MIN: CPT | Performed by: PODIATRIST

## 2025-07-01 RX ORDER — ACYCLOVIR 400 MG/1
1 TABLET ORAL
Qty: 3 EACH | Refills: 11 | Status: CANCELLED | OUTPATIENT
Start: 2025-07-01

## 2025-07-01 NOTE — TELEPHONE ENCOUNTER
Called [facility] pharmacy. Dexcom G7 sensors are on nationwide backorder. They are hoping to get some by the end of the week but cannot guarantee a time they will be ready.     Pharmacist also checked and he said insurance will require a prior auth for the Dexcom G7 sensors because patient picked up Trent 3 sensors on 6/16.     The Dexcom CGM was approved by insurance but sensors need prior auth.

## 2025-07-01 NOTE — PROGRESS NOTES
[facility] New Patient Note    CC:   Chief Complaint   Patient presents with    Foot Pain     RT foot/ankle swelling, onset: 2mos ago  Denies prior inj/sx/trauma       HPI: The patient is a 64 year old male who presents today with complaints of swelling of foot and ankle for about 2 months.  He is not responding to treatment for swelling.  No injury he can recall but does enjoy walking a lot and admits to wearing sketchers and boat shoe type shoes, less good athletic shoes  Retired       Numbness? Some in both feet  Gets most tender along leg, inside of ankle and outside of foot-    Lasix not helping-not any difference    Past Medical History[1]  Past Surgical History[2]  Current Medications[3]  Allergies[4]  Family History[5]  Social History     Occupational History    Occupation: [employer] [place]    Tobacco Use    Smoking status: Never    Smokeless tobacco: Never   Vaping Use    Vaping status: Never Used   Substance and Sexual Activity    Alcohol use: Yes     Alcohol/week: 2.0 standard drinks of alcohol     Types: 1 Glasses of wine, 1 Cans of beer per week     Comment: RARE    Drug use: Never    Sexual activity: Not Currently        ROS:  Complete ROS reviewed by me and non-contributory to the chief complaint except as mentioned above.    Physical Exam:    There were no vitals taken for this visit.      Upon stance he has actually maintained his arch left and a little loss of arch height right but minimal.  Significant warmth mild redness and swelling of the right foot and ankle.  Most of the warmth is situated over the dorsal lateral midfoot region.  He has minimal to no pain.  No open lesions or plantar calluses  Pulses are nonpalpable due to swelling  Swelling up to the leg  Sensation diminished  Strength testing deferred today    Imaging:  foot views right -disorganized appearance to midfoot joints , early subluxation noted.  Abundant spurring and fractures of midfoot.   Significant swelling.  Ankle views right - ankle mortise clear.  Increased swelling    personally viewed, independently interpreted and radiology report read.      Assessment/Diagnoses:  Diagnoses and all orders for this visit:    Charcot arthropathy of midfoot  -     DME - EXTERNAL     Type 2 diabetes mellitus with diabetic neuropathic arthropathy, without long-term current use of insulin (HCC)  -     DME - EXTERNAL         Plan:  I reviewed imaging and exam findings with the patient.  We looked at the x-rays together and I discussed the appearance of the midfoot with the fractures and appearance of Charcot arthropathy.  We discussed what that is and  the stages.  Basically he had a fracture at some point in the midfoot but went unrecognized because of his neuropathy.  With continued activity the fractures worsen and the joints can dislocate basically.  He is at risk of a rocker-bottom foot and of dislocation of the midfoot and the ankle joint.  He is also at risk of pressure ulcers and ulcerations with eventual infection and even worst-case scenario amputation of the foot.  Discussed the treatment for Charcot arthropathy, mainly getting the swelling down and immobilization to arrest the process.  We discussed surgical and nonsurgical treatment.    Pt has an active charcot process  High profile boot    Could consider knee scooter he has an issue with his knee.  He  is retired and he can really stay off the foot    RICE  Baby asa - chk with Dr Ashton  Immobilization and dvt prophylaxis       Was planning on seeing him back in 1 month but will follow-up with my colleague Dr. Vita Mishra, DPMPodiatric Surgery  FACFAS  [facility]  [address] [place] [address], [address] [address]   [address] [place], [place] [address] [place] [email or web address]  [email or web address]  t: [phone]   f: [phone]              This document was partially prepared using Dragon Medical voice recognition  software.            [1]   Past Medical History:   Allergic rhinitis    Arthritis    Asthma (HCC)    Diabetes (HCC)    Diabetes mellitus (HCC)    Diverticulosis of intestine    Essential hypertension    High blood pressure    High cholesterol    History of colon polyps    History of stomach ulcers    Hyperlipidemia    Hypertension    Irregular bowel habits    Obesity    MADY (obstructive sleep apnea)    on CPAP 9cm    PONV (postoperative nausea and vomiting)    Sleep apnea    CPAP    Visual impairment    GLASSES    Wears glasses    Weight gain    After taking Type 2 Diabetes Medicines   [2]   Past Surgical History:  Procedure Laterality Date    Colonoscopy  2008    Colonoscopy  01/11/2017    adenomatous polyp, diverticuli and int hem    Knee arthroscopy  1988    Other surgical history      deviated septum, nasal surgery    Other surgical history      arthroscopic knee surgery right    Stomach surgery procedure unlisted  1990   [3]   Current Outpatient Medications   Medication Sig Dispense Refill    furosemide (LASIX) 20 MG Oral Tab Take 1 tablet (20 mg total) by mouth daily. 10 tablet 0    Continuous Glucose Sensor (DEXCOM G7 SENSOR) Does not apply Misc 1 each Every 10 days. Use as directed every 10 days 3 each 11    Continuous Glucose  (DEXCOM G7 ) Does not apply Device 1 each daily. 1 each 0    semaglutide 4 MG/3ML Subcutaneous Solution Pen-injector Inject 1 mg into the skin once a week. 1 each 12    Fosinopril Sodium 20 MG Oral Tab Take 1 tablet (20 mg total) by mouth daily. 90 tablet 3    Continuous Glucose Sensor (FREESTYLE CELESTINE 3 SENSOR) Does not apply Misc 1 each every 14 (fourteen) days. Apply sensor every 14 days 6 each 3    SITagliptin-metFORMIN HCl (JANUMET)  MG Oral Tab Take 1 tablet by mouth 2 (two) times daily with meals. 180 tablet 3    Insulin Pen Needle (PEN NEEDLES) 32G X 4 MM Does not apply Misc Inject 1 Needle into the skin at bedtime. 100 each 3    pioglitazone 30 MG Oral  Tab Take 1 tablet (30 mg total) by mouth daily. 90 tablet 3    rosuvastatin 10 MG Oral Tab Take 1 tablet (10 mg total) by mouth nightly. 90 tablet 3    Insulin Glargine-yfgn 100 UNIT/ML Subcutaneous Solution Pen-injector Inject 20 Units into the skin nightly. 21 mL 3    pantoprazole 20 MG Oral Tab EC Take 1 tablet (20 mg total) by mouth before breakfast. (Patient not taking: Reported on 6/20/2025) 90 tablet 0    PREVIDENT 5000 DRY MOUTH 1.1 % Dental Gel       Fluticasone Propionate 50 MCG/ACT Nasal Suspension Two sprays in each nostril once daily 1 Bottle 6   [4]   Allergies  Allergen Reactions    Cephalosporins HIVES    Penicillins HIVES   [5]   Family History  Problem Relation Age of Onset    Prostate Cancer Father     Diabetes Mother     Hypertension Mother

## 2025-07-02 ENCOUNTER — TELEPHONE (OUTPATIENT)
Dept: ORTHOPEDICS CLINIC | Facility: CLINIC | Age: 65
End: 2025-07-02

## 2025-07-02 NOTE — TELEPHONE ENCOUNTER
Per ,  said OK to book this patient with her in 4 weeks from his visit which was yesterday. Not sure if she wants a particular time slot.

## 2025-07-02 NOTE — TELEPHONE ENCOUNTER
Called patient and appointment scheduled on 7/7 at 250pm with Dr Gorman at [facility].

## 2025-07-07 ENCOUNTER — HOSPITAL ENCOUNTER (OUTPATIENT)
Dept: GENERAL RADIOLOGY | Age: 65
Discharge: HOME | End: 2025-07-07
Attending: PODIATRIST
Payer: COMMERCIAL

## 2025-07-07 ENCOUNTER — LAB ENCOUNTER (OUTPATIENT)
Dept: LAB | Facility: HOSPITAL | Age: 65
End: 2025-07-07
Attending: PODIATRIST
Payer: COMMERCIAL

## 2025-07-07 ENCOUNTER — TELEPHONE (OUTPATIENT)
Dept: PODIATRY CLINIC | Facility: CLINIC | Age: 65
End: 2025-07-07

## 2025-07-07 ENCOUNTER — OFFICE VISIT (OUTPATIENT)
Dept: PODIATRY CLINIC | Facility: CLINIC | Age: 65
End: 2025-07-07
Payer: COMMERCIAL

## 2025-07-07 DIAGNOSIS — M14.671 CHARCOT JOINT OF RIGHT FOOT: ICD-10-CM

## 2025-07-07 DIAGNOSIS — R80.9 TYPE 2 DIABETES MELLITUS WITH MICROALBUMINURIA, WITHOUT LONG-TERM CURRENT USE OF INSULIN (HCC): Primary | ICD-10-CM

## 2025-07-07 DIAGNOSIS — E11.29 TYPE 2 DIABETES MELLITUS WITH MICROALBUMINURIA, WITHOUT LONG-TERM CURRENT USE OF INSULIN (HCC): ICD-10-CM

## 2025-07-07 DIAGNOSIS — E66.813 CLASS 3 SEVERE OBESITY DUE TO EXCESS CALORIES WITHOUT SERIOUS COMORBIDITY WITH BODY MASS INDEX (BMI) OF 40.0 TO 44.9 IN ADULT: ICD-10-CM

## 2025-07-07 DIAGNOSIS — R80.9 TYPE 2 DIABETES MELLITUS WITH MICROALBUMINURIA, WITHOUT LONG-TERM CURRENT USE OF INSULIN (HCC): ICD-10-CM

## 2025-07-07 DIAGNOSIS — E66.813 CLASS 3 SEVERE OBESITY DUE TO EXCESS CALORIES WITHOUT SERIOUS COMORBIDITY WITH BODY MASS INDEX (BMI) OF 40.0 TO 44.9 IN ADULT: Primary | ICD-10-CM

## 2025-07-07 DIAGNOSIS — E11.29 TYPE 2 DIABETES MELLITUS WITH MICROALBUMINURIA, WITHOUT LONG-TERM CURRENT USE OF INSULIN (HCC): Primary | ICD-10-CM

## 2025-07-07 LAB — VIT D+METAB SERPL-MCNC: 27.7 NG/ML (ref 30–100)

## 2025-07-07 PROCEDURE — 36415 COLL VENOUS BLD VENIPUNCTURE: CPT

## 2025-07-07 PROCEDURE — 73620 X-RAY EXAM OF FOOT: CPT | Performed by: PODIATRIST

## 2025-07-07 PROCEDURE — 73600 X-RAY EXAM OF ANKLE: CPT | Performed by: PODIATRIST

## 2025-07-07 PROCEDURE — 82306 VITAMIN D 25 HYDROXY: CPT

## 2025-07-07 NOTE — PROGRESS NOTES
Reason for Visit      Tye Sylvester is a 64 year old male presents today complaining of right foot Charcot arthropathy.     History of Present Illness     Patient presents to clinic today after being referred to podiatry for assessment of right lower extremity Charcot arthropathy.  Patient was discussed both conservative and surgical treatment as well as risk and benefits of each.  Patient presents to clinic today in a walking boot.  Patient states this is the first time that he has ever had a Charcot attack.  Patient denies any significant pain now that he is wearing the boot.  Patient had pain prior.    The following portions of the patient's history were reviewed and updated as appropriate: allergies, current medications, past family history, past medical history, past social history, past surgical history and problem list.    Allergies[1]    Medications - Current[2]    There are no discontinued medications.    Problem List[3]    Past Medical History[4]    Past Surgical History[5]    Family History[6]    Social History     Occupational History    Occupation: [employer]    Tobacco Use    Smoking status: Never    Smokeless tobacco: Never   Vaping Use    Vaping status: Never Used   Substance and Sexual Activity    Alcohol use: Yes     Alcohol/week: 2.0 standard drinks of alcohol     Types: 1 Glasses of wine, 1 Cans of beer per week     Comment: RARE    Drug use: Never    Sexual activity: Not Currently       ROS      Constitutional: negative for chills, fevers and sweats  Gastrointestinal: negative for abdominal pain, diarrhea, nausea and vomiting  Genitourinary:negative for dysuria and hematuria  Musculoskeletal:negative for arthralgias and muscle weakness  Neurological: negative for paresthesia and weakness  All others reviewed and negative.      Physical Exam     LE PHYSICAL EXAM    Constitution: Well-developed and well-nourished. Gait appears normal. No apparent distress. Alert and oriented to  person, place, and time.  Integument: There are no varicosities. Skin appears moist, warm, and supple with positive hair growth. There are no color changes. No open lesions. No macerations, No Hyperkeratotic lesions.  Vascular examination: Dorsalis pedis and posterior tibial pulses are strong bilaterally with capillary filling time less than 3 seconds to all digits. There is no peripheral edema..  Neurological Sensorium: Grossly intact to sharp/dull. Vibratory: Intact.  Musculoskeletal:   5/5 pedal muscle strength b/l     Edema noted to the dorsal aspect of the right foot as well as the posterior ankle.  No calf pain noted.  No pain with dorsiflexion of the right ankle.  Negative Domingo and Pierce sign.  +2 pitting edema noted to the dorsal aspect of the right foot.  Assessment and Plan     Encounter Diagnoses   Name Primary?    Class 3 severe obesity due to excess calories without serious comorbidity with body mass index (BMI) of 40.0 to 44.9 in adult Yes    Charcot joint of right foot    Specifically midfoot Charcot  Discussed risk and benefits of conservative versus surgical treatment in great detail.  Discussed both custom Crow walker, [place] versus AFO as well as surgical intervention.  This is the first time patient has had a acute Charcot attack and has no bony prominences or signs of preulcerative lesions.  Patient still in the acute phase.  Discussed surgical innervation of fusion with external/internal fixation.  Discussed at this point I do not recommended as there is no risk of infection at this point though I cannot guarantee it down the road.  Patient will continue wearing the walking boot and we will order vitamin D level as well as repeat x-rays in 4 weeks to see if he can transition out of the walking boot.    Patient was instructed to call the office or on-call podiatric physician immediately with any issues or concerns before the next scheduled visit. Patient to follow-up in clinic in 4  ingrid Simms DPM, D.CARRIE, LINA  Diplomat, American Board of Foot and Ankle Surgery  Certified in Foot and Rearfoot/Ankle Reconstruction  Fellow of the American College of Foot and Ankle Surgeons  Fellowship Trained Foot and Ankle Surgeon   [facility]     7/7/2025    3:29 PM         [1]   Allergies  Allergen Reactions    Cephalosporins HIVES    Penicillins HIVES   [2]   Current Outpatient Medications:     furosemide (LASIX) 20 MG Oral Tab, Take 1 tablet (20 mg total) by mouth daily., Disp: 10 tablet, Rfl: 0    Continuous Glucose Sensor (DEXCOM G7 SENSOR) Does not apply Misc, 1 each Every 10 days. Use as directed every 10 days, Disp: 3 each, Rfl: 11    Continuous Glucose  (DEXCOM G7 ) Does not apply Device, 1 each daily., Disp: 1 each, Rfl: 0    semaglutide 4 MG/3ML Subcutaneous Solution Pen-injector, Inject 1 mg into the skin once a week., Disp: 1 each, Rfl: 12    Fosinopril Sodium 20 MG Oral Tab, Take 1 tablet (20 mg total) by mouth daily., Disp: 90 tablet, Rfl: 3    Continuous Glucose Sensor (FREESTYLE CELESTINE 3 SENSOR) Does not apply Misc, 1 each every 14 (fourteen) days. Apply sensor every 14 days, Disp: 6 each, Rfl: 3    SITagliptin-metFORMIN HCl (JANUMET)  MG Oral Tab, Take 1 tablet by mouth 2 (two) times daily with meals., Disp: 180 tablet, Rfl: 3    Insulin Pen Needle (PEN NEEDLES) 32G X 4 MM Does not apply Misc, Inject 1 Needle into the skin at bedtime., Disp: 100 each, Rfl: 3    pioglitazone 30 MG Oral Tab, Take 1 tablet (30 mg total) by mouth daily., Disp: 90 tablet, Rfl: 3    rosuvastatin 10 MG Oral Tab, Take 1 tablet (10 mg total) by mouth nightly., Disp: 90 tablet, Rfl: 3    Insulin Glargine-yfgn 100 UNIT/ML Subcutaneous Solution Pen-injector, Inject 20 Units into the skin nightly., Disp: 21 mL, Rfl: 3    PREVIDENT 5000 DRY MOUTH 1.1 % Dental Gel, , Disp: , Rfl:     Fluticasone Propionate 50 MCG/ACT Nasal Suspension, Two sprays in each nostril  once daily, Disp: 1 Bottle, Rfl: 6    pantoprazole 20 MG Oral Tab EC, Take 1 tablet (20 mg total) by mouth before breakfast. (Patient not taking: Reported on 7/7/2025), Disp: 90 tablet, Rfl: 0  [3]   Patient Active Problem List  Diagnosis    History of colon polyps    Allergic rhinitis    Sleep apnea, obstructive    Obesity, unspecified    Essential hypertension, benign    Mixed hyperlipidemia    Type 2 diabetes mellitus with microalbuminuria, without long-term current use of insulin (HCC)    Sinusitis, chronic    History of adenomatous polyp of colon    Abscess of anal and rectal regions    Anal fistula    Asthma without status asthmaticus (HCC)    Dysphagia, unspecified    Gastric ulcer, acute with hemorrhage   [4]   Past Medical History:   Allergic rhinitis    Arthritis    Asthma (HCC)    Diabetes (HCC)    Diabetes mellitus (HCC)    Diverticulosis of intestine    Essential hypertension    High blood pressure    High cholesterol    History of colon polyps    History of stomach ulcers    Hyperlipidemia    Hypertension    Irregular bowel habits    Obesity    MADY (obstructive sleep apnea)    on CPAP 9cm    PONV (postoperative nausea and vomiting)    Sleep apnea    CPAP    Visual impairment    GLASSES    Wears glasses    Weight gain    After taking Type 2 Diabetes Medicines   [5]   Past Surgical History:  Procedure Laterality Date    Colonoscopy  2008    Colonoscopy  01/11/2017    adenomatous polyp, diverticuli and int hem    Knee arthroscopy  1988    Other surgical history      deviated septum, nasal surgery    Other surgical history      arthroscopic knee surgery right    Stomach surgery procedure unlisted  1990   [6]   Family History  Problem Relation Age of Onset    Prostate Cancer Father     Diabetes Mother     Hypertension Mother

## 2025-07-08 NOTE — TELEPHONE ENCOUNTER
JEANMARIE PARRY (Key: [id number])  Need Help? Call us at [phone]  Outcome  Additional Information Required  Your PA has been resolved, no additional PA is required. For further inquiries please contact the number on the back of the member prescription card. (Message [phone])

## 2025-07-22 ENCOUNTER — TELEPHONE (OUTPATIENT)
Facility: CLINIC | Age: 65
End: 2025-07-22

## 2025-07-22 DIAGNOSIS — G47.33 OSA (OBSTRUCTIVE SLEEP APNEA): Primary | ICD-10-CM

## 2025-07-22 NOTE — TELEPHONE ENCOUNTER
----- Message from Melanie Solomon sent at 7/22/2025  2:51 PM CDT -----  HST is ok  ----- Message -----  From: Ray Rogers RT  Sent: 7/22/2025  12:07 PM CDT  To: Melanie Solomon PA-C; Whitney Fitzgerald, DO    Do you want to do the P2P or just proceed with HST?   Denial letter is in media file with p2p ph number.  ----- Message -----  From: Kasey Cruz  Sent: 7/18/2025   7:36 AM CDT  To: Whitney Fitzgerald DO; [facility]; [facility]    Dr. Fitzgerald,    This patient was denied for a PSG.  The denial is attached if you would like to review.  Please follow up with the patient.    Thank you,  Kasey Cruz

## 2025-07-24 ENCOUNTER — TELEPHONE (OUTPATIENT)
Facility: CLINIC | Age: 65
End: 2025-07-24

## 2025-07-24 ENCOUNTER — HOSPITAL ENCOUNTER (OUTPATIENT)
Dept: GENERAL RADIOLOGY | Age: 65
Discharge: HOME | End: 2025-07-24
Attending: PODIATRIST
Payer: COMMERCIAL

## 2025-07-24 DIAGNOSIS — M14.671 CHARCOT JOINT OF RIGHT FOOT: ICD-10-CM

## 2025-07-24 PROCEDURE — 73610 X-RAY EXAM OF ANKLE: CPT | Performed by: PODIATRIST

## 2025-07-24 PROCEDURE — 73630 X-RAY EXAM OF FOOT: CPT | Performed by: PODIATRIST

## 2025-07-24 PROCEDURE — 73600 X-RAY EXAM OF ANKLE: CPT | Performed by: PODIATRIST

## 2025-07-24 NOTE — TELEPHONE ENCOUNTER
Informed him that inlab denied,  to proceed with HST only.  Pt instructed to call sleep lab to schedule HST. Pt agreed with plan.

## 2025-07-30 ENCOUNTER — MED REC SCAN ONLY (OUTPATIENT)
Facility: CLINIC | Age: 65
End: 2025-07-30

## 2025-08-05 ENCOUNTER — HOSPITAL ENCOUNTER (OUTPATIENT)
Dept: GENERAL RADIOLOGY | Age: 65
Discharge: HOME | End: 2025-08-05
Attending: PODIATRIST

## 2025-08-05 ENCOUNTER — TELEPHONE (OUTPATIENT)
Dept: PODIATRY CLINIC | Facility: CLINIC | Age: 65
End: 2025-08-05

## 2025-08-05 ENCOUNTER — OFFICE VISIT (OUTPATIENT)
Dept: PODIATRY CLINIC | Facility: CLINIC | Age: 65
End: 2025-08-05

## 2025-08-05 DIAGNOSIS — E11.29 TYPE 2 DIABETES MELLITUS WITH MICROALBUMINURIA, WITHOUT LONG-TERM CURRENT USE OF INSULIN (HCC): Primary | ICD-10-CM

## 2025-08-05 DIAGNOSIS — M14.671 CHARCOT JOINT OF RIGHT FOOT: ICD-10-CM

## 2025-08-05 DIAGNOSIS — R80.9 TYPE 2 DIABETES MELLITUS WITH MICROALBUMINURIA, WITHOUT LONG-TERM CURRENT USE OF INSULIN (HCC): ICD-10-CM

## 2025-08-05 DIAGNOSIS — E11.29 TYPE 2 DIABETES MELLITUS WITH MICROALBUMINURIA, WITHOUT LONG-TERM CURRENT USE OF INSULIN (HCC): ICD-10-CM

## 2025-08-05 DIAGNOSIS — R80.9 TYPE 2 DIABETES MELLITUS WITH MICROALBUMINURIA, WITHOUT LONG-TERM CURRENT USE OF INSULIN (HCC): Primary | ICD-10-CM

## 2025-08-05 PROCEDURE — 73620 X-RAY EXAM OF FOOT: CPT | Performed by: PODIATRIST

## 2025-08-05 PROCEDURE — 99214 OFFICE O/P EST MOD 30 MIN: CPT | Performed by: PODIATRIST

## 2025-08-13 ENCOUNTER — OFFICE VISIT (OUTPATIENT)
Dept: SLEEP CENTER | Age: 65
End: 2025-08-13
Attending: Other

## 2025-08-13 DIAGNOSIS — E11.29 TYPE 2 DIABETES MELLITUS WITH MICROALBUMINURIA, WITHOUT LONG-TERM CURRENT USE OF INSULIN (HCC): ICD-10-CM

## 2025-08-13 DIAGNOSIS — R80.9 TYPE 2 DIABETES MELLITUS WITH MICROALBUMINURIA, WITHOUT LONG-TERM CURRENT USE OF INSULIN (HCC): ICD-10-CM

## 2025-08-13 DIAGNOSIS — E66.813 CLASS 3 SEVERE OBESITY DUE TO EXCESS CALORIES WITHOUT SERIOUS COMORBIDITY WITH BODY MASS INDEX (BMI) OF 40.0 TO 44.9 IN ADULT: ICD-10-CM

## 2025-08-13 DIAGNOSIS — G47.33 SLEEP APNEA, OBSTRUCTIVE: ICD-10-CM

## 2025-08-13 DIAGNOSIS — J45.20 MILD INTERMITTENT ASTHMA WITHOUT STATUS ASTHMATICUS WITHOUT COMPLICATION (HCC): ICD-10-CM

## 2025-08-13 DIAGNOSIS — G47.33 OSA (OBSTRUCTIVE SLEEP APNEA): ICD-10-CM

## 2025-08-13 PROCEDURE — 95806 SLEEP STUDY UNATT&RESP EFFT: CPT

## 2025-08-22 ENCOUNTER — PATIENT MESSAGE (OUTPATIENT)
Facility: CLINIC | Age: 65
End: 2025-08-22

## 2025-08-22 DIAGNOSIS — E11.65 TYPE 2 DIABETES MELLITUS WITH HYPERGLYCEMIA, WITHOUT LONG-TERM CURRENT USE OF INSULIN (HCC): ICD-10-CM

## 2025-08-27 ENCOUNTER — OFFICE VISIT (OUTPATIENT)
Dept: PODIATRY CLINIC | Facility: CLINIC | Age: 65
End: 2025-08-27

## 2025-08-27 ENCOUNTER — TELEPHONE (OUTPATIENT)
Dept: PODIATRY CLINIC | Facility: CLINIC | Age: 65
End: 2025-08-27

## 2025-08-27 ENCOUNTER — HOSPITAL ENCOUNTER (OUTPATIENT)
Dept: GENERAL RADIOLOGY | Age: 65
Discharge: HOME | End: 2025-08-27
Attending: PODIATRIST

## 2025-08-27 ENCOUNTER — HOSPITAL ENCOUNTER (OUTPATIENT)
Dept: CT IMAGING | Facility: HOSPITAL | Age: 65
Discharge: HOME | End: 2025-08-27
Attending: PODIATRIST

## 2025-08-27 DIAGNOSIS — R80.9 TYPE 2 DIABETES MELLITUS WITH MICROALBUMINURIA, WITHOUT LONG-TERM CURRENT USE OF INSULIN (HCC): ICD-10-CM

## 2025-08-27 DIAGNOSIS — M14.671 CHARCOT JOINT OF RIGHT FOOT: Primary | ICD-10-CM

## 2025-08-27 DIAGNOSIS — M14.671 CHARCOT JOINT OF RIGHT FOOT: ICD-10-CM

## 2025-08-27 DIAGNOSIS — E11.29 TYPE 2 DIABETES MELLITUS WITH MICROALBUMINURIA, WITHOUT LONG-TERM CURRENT USE OF INSULIN (HCC): Primary | ICD-10-CM

## 2025-08-27 DIAGNOSIS — E11.29 TYPE 2 DIABETES MELLITUS WITH MICROALBUMINURIA, WITHOUT LONG-TERM CURRENT USE OF INSULIN (HCC): ICD-10-CM

## 2025-08-27 DIAGNOSIS — R80.9 TYPE 2 DIABETES MELLITUS WITH MICROALBUMINURIA, WITHOUT LONG-TERM CURRENT USE OF INSULIN (HCC): Primary | ICD-10-CM

## 2025-08-27 PROCEDURE — 73620 X-RAY EXAM OF FOOT: CPT | Performed by: PODIATRIST

## 2025-08-27 PROCEDURE — 99214 OFFICE O/P EST MOD 30 MIN: CPT | Performed by: PODIATRIST

## 2025-08-27 PROCEDURE — 73700 CT LOWER EXTREMITY W/O DYE: CPT | Performed by: PODIATRIST

## 2025-08-27 RX ORDER — INSULIN GLARGINE-YFGN 100 [IU]/ML
20 INJECTION, SOLUTION SUBCUTANEOUS NIGHTLY
Qty: 21 ML | Refills: 3 | Status: SHIPPED | OUTPATIENT
Start: 2025-08-27

## (undated) DIAGNOSIS — Z13.0 SCREENING FOR DISORDER OF BLOOD AND BLOOD-FORMING ORGANS: ICD-10-CM

## (undated) DIAGNOSIS — E11.29 TYPE 2 DIABETES MELLITUS WITH MICROALBUMINURIA, WITHOUT LONG-TERM CURRENT USE OF INSULIN (HCC): ICD-10-CM

## (undated) DIAGNOSIS — Z13.220 SCREENING FOR LIPID DISORDERS: ICD-10-CM

## (undated) DIAGNOSIS — Z13.29 SCREENING FOR THYROID DISORDER: ICD-10-CM

## (undated) DIAGNOSIS — Z12.5 SCREENING FOR MALIGNANT NEOPLASM OF PROSTATE: ICD-10-CM

## (undated) DIAGNOSIS — Z13.228 SCREENING FOR METABOLIC DISORDER: ICD-10-CM

## (undated) DIAGNOSIS — R80.9 TYPE 2 DIABETES MELLITUS WITH MICROALBUMINURIA, WITHOUT LONG-TERM CURRENT USE OF INSULIN (HCC): ICD-10-CM

## (undated) DIAGNOSIS — Z00.00 ROUTINE GENERAL MEDICAL EXAMINATION AT A HEALTH CARE FACILITY: Primary | ICD-10-CM

## (undated) DEVICE — GAUZE,SPONGE,4"X4",12PLY,STERILE,LF,2'S: Brand: MEDLINE

## (undated) DEVICE — GLOVE SUR 7.5 SENSICARE PI PIP GRN PWD F

## (undated) DEVICE — SLEEVE COMPR MD KNEE LEN SGL USE KENDALL SCD

## (undated) DEVICE — SUT MCRYL 4-0 18IN PS-2 ABSRB UD 19MM 3/8 CIR

## (undated) DEVICE — GLOVE SUR 7 SENSICARE PI PIP CRM PWD F

## (undated) DEVICE — MINI LAP PACK-LF: Brand: MEDLINE INDUSTRIES, INC.

## (undated) DEVICE — YANKAUER,FLEXIBLE HANDLE,FINE CAPACITY: Brand: MEDLINE

## (undated) DEVICE — ABSORBABLE WOUND CLOSURE DEVICE: Brand: V-LOC 90

## (undated) DEVICE — ADHESIVE SKIN TOP FOR WND CLSR DERMBND ADV

## (undated) DEVICE — SOLUTION IRRIG 1000ML 0.9% NACL USP BTL

## (undated) DEVICE — SYRINGE MED 10ML LL TIP W/O SFTY DISP

## (undated) NOTE — Clinical Note
1/23/2017          [address]   [address] [address]    Dear Coral Shen,    Here are the biopsy/pathology findings from your recent EGD (upper endoscopy) and colonoscopy:     The biopsy/pathology findings from your upper endoscopy sh

## (undated) NOTE — LETTER
ASTHMA ACTION PLAN for Tye Sylvester     : 1960     Date: 12/10/24  Doctor:  Michael Ashton MD  Phone for doctor or clinic: [facility], [address] [place] [id number] [phone]      ACT Score: 25    ACT Goal: 20 or greater    Call your provider if you require your rescue/quick reliever medication more than 2-3 times in a 24 hour period.    If you require your rescue inhaler/medication more than 2-3 times weekly, your asthma may not be under proper control and you should seek medical attention.    *Quick Relievers are Xopenex and Albuterol*    You can use the colors of a traffic light to help learn about your asthma medicines.  {Therapy/Year Round/Winter Mgmt/Seasonal:5667}       1. Green - Go! % of Personal Best Peak Flow   Use controller medicine.   Breathing is good  No cough or wheeze  Can work and play Medicine How much to take When to take it                2. Yellow - Caution. 50-79% Personal Best Peak Flow  Use reliever medicine to keep an asthma attack from getting bad.   Cough  Quick Relievers  Wheezing  Tight Chest  Wake up at night Medicine How much to take When to take it    If symptoms are not improving in 24-48 hrs, call office for further instructions              3. Red - Stop! Danger! <50% Personal Best Peak Flow  Continue Controller Medications But ADD:   Medicine not helping  Breathing is hard and fast  Nose opens wide  Can't walk  Ribs show  Can't talk well Medicine How much to take When to take it    If your symptoms do not improve in ONE hour -  go to the emergency room or call 911 immediately! If symptoms improve, call office for appointment immediately.    {Choose Albuterol/Xopenex INHALER or NEBULIZER every 20 min:6211}       Don't forget:  Rinse mouth after using inhaler  Use spacer for inhaler  Remember to get your Flu vaccine every fall!    [x] Asthma Action Plan reviewed with the caregiver and patient,  and a copy of the plan was given to the patient/caregiver.   [] Asthma Action Plan reviewed with the caregiver and patient on the phone, and copy mailed to patient/caregiver or sent via [employer].     Signatures:   Provider  Michael Ashton MD Patient  Tye Sylvester Caretaker       ASTHMA ACTION PLAN for Tye Sylvester     : 1960     Date: 12/10/2024  Provider:  Michael Ashton MD  Phone for doctor or clinic: [facility], [address] [place] [id number] [phone]    ACT Score: 25      You can use the colors of a traffic light to help learn about your asthma medicines.      1. Green - Go! % of Personal Best Peak Flow Use controller medicine.   Breathing is good  No cough or wheeze  Can work and play Medicine How much to take When to take it          2. Yellow - Caution. 50-79% Personal Best Peak  Flow.  Use reliever medicine to keep an asthma attack from getting bad.   Cough  Wheezing  Tight Chest  Wake up at night Medicine How much to take When to take it           Additional instructions         3. Red - Stop! Danger!  <50% Personal Best Peak  Flow. Take these medications until  Get help from a doctor   Medicine not helping  Breathing is hard and fast  Nose opens wide  Can't walk  Ribs show  Can't talk well Medicine How much to take When to take it         Additional Instructions If your symptoms do not improve and you cannot contact your doctor, [facility] or call [facility] immediately!     [x] Asthma Action Plan reviewed with patient (and caregiver if necessary) and a copy of the plan was given to the patient/caregiver.   [] Asthma Action Plan reviewed with patient (and caregiver if necessary) on the phone and mailed copy to patient or submitted via [employer].     Signatures:  Provider  Michael Ashton MD   Patient Caretaker

## (undated) NOTE — LETTER
01/04/21        [place] [id number] [place] [id number]      Dear [facility] Leader records indicate that you have outstanding lab work and or testing that was ordered for you and has not yet been completed:  Orders Placed This Encount